# Patient Record
Sex: FEMALE | Race: WHITE
[De-identification: names, ages, dates, MRNs, and addresses within clinical notes are randomized per-mention and may not be internally consistent; named-entity substitution may affect disease eponyms.]

---

## 2018-09-18 ENCOUNTER — HOSPITAL ENCOUNTER (OUTPATIENT)
Dept: HOSPITAL 19 - COL.RAD | Age: 83
End: 2018-09-18
Attending: FAMILY MEDICINE
Payer: MEDICARE

## 2018-09-18 DIAGNOSIS — S22.070G: Primary | ICD-10-CM

## 2018-09-18 DIAGNOSIS — M48.04: ICD-10-CM

## 2018-09-27 ENCOUNTER — HOSPITAL ENCOUNTER (OUTPATIENT)
Dept: HOSPITAL 19 - COL.CAR | Age: 83
Discharge: HOME | End: 2018-09-27
Attending: FAMILY MEDICINE
Payer: MEDICARE

## 2018-09-27 VITALS — HEART RATE: 66 BPM | SYSTOLIC BLOOD PRESSURE: 134 MMHG | DIASTOLIC BLOOD PRESSURE: 66 MMHG

## 2018-09-27 VITALS — DIASTOLIC BLOOD PRESSURE: 66 MMHG | SYSTOLIC BLOOD PRESSURE: 150 MMHG | HEART RATE: 69 BPM

## 2018-09-27 VITALS — DIASTOLIC BLOOD PRESSURE: 69 MMHG | TEMPERATURE: 98.5 F | SYSTOLIC BLOOD PRESSURE: 158 MMHG | HEART RATE: 66 BPM

## 2018-09-27 VITALS — BODY MASS INDEX: 19 KG/M2 | HEIGHT: 67.01 IN | WEIGHT: 121.03 LBS

## 2018-09-27 VITALS — SYSTOLIC BLOOD PRESSURE: 137 MMHG | DIASTOLIC BLOOD PRESSURE: 66 MMHG | HEART RATE: 64 BPM

## 2018-09-27 VITALS — DIASTOLIC BLOOD PRESSURE: 85 MMHG | HEART RATE: 73 BPM | SYSTOLIC BLOOD PRESSURE: 178 MMHG

## 2018-09-27 VITALS — HEART RATE: 73 BPM | DIASTOLIC BLOOD PRESSURE: 82 MMHG | SYSTOLIC BLOOD PRESSURE: 156 MMHG

## 2018-09-27 VITALS — HEART RATE: 66 BPM | DIASTOLIC BLOOD PRESSURE: 65 MMHG | SYSTOLIC BLOOD PRESSURE: 145 MMHG

## 2018-09-27 VITALS — DIASTOLIC BLOOD PRESSURE: 64 MMHG | SYSTOLIC BLOOD PRESSURE: 128 MMHG | HEART RATE: 64 BPM

## 2018-09-27 DIAGNOSIS — S22.080A: Primary | ICD-10-CM

## 2018-09-27 DIAGNOSIS — Z90.710: ICD-10-CM

## 2018-09-27 DIAGNOSIS — Z96.642: ICD-10-CM

## 2018-09-27 DIAGNOSIS — Z88.8: ICD-10-CM

## 2018-11-05 ENCOUNTER — HOSPITAL ENCOUNTER (EMERGENCY)
Dept: HOSPITAL 19 - COL.ER | Age: 83
End: 2018-11-05
Payer: MEDICARE

## 2018-11-05 VITALS — HEART RATE: 85 BPM | DIASTOLIC BLOOD PRESSURE: 66 MMHG | SYSTOLIC BLOOD PRESSURE: 147 MMHG

## 2018-11-05 VITALS — WEIGHT: 115.3 LBS | BODY MASS INDEX: 20.43 KG/M2 | HEIGHT: 62.99 IN

## 2018-11-05 VITALS — TEMPERATURE: 98 F
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
## 2018-11-05 DIAGNOSIS — Z91.81: ICD-10-CM

## 2018-11-05 DIAGNOSIS — Z79.51: ICD-10-CM

## 2018-11-05 DIAGNOSIS — F03.90: ICD-10-CM

## 2018-11-05 DIAGNOSIS — S30.0XXA: Primary | ICD-10-CM
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
## 2020-05-04 ENCOUNTER — HOSPITAL ENCOUNTER (INPATIENT)
Dept: HOSPITAL 19 - SURG | Age: 85
LOS: 6 days | Discharge: NURSING, REHAB OR LONG-TERM CARE | DRG: 480 | End: 2020-05-10
Attending: ORTHOPAEDIC SURGERY | Admitting: INTERNAL MEDICINE
Payer: MEDICARE

## 2020-05-04 VITALS — TEMPERATURE: 98.9 F | HEART RATE: 78 BPM | DIASTOLIC BLOOD PRESSURE: 69 MMHG | SYSTOLIC BLOOD PRESSURE: 106 MMHG

## 2020-05-04 VITALS — WEIGHT: 128.97 LBS | BODY MASS INDEX: 20.24 KG/M2 | HEIGHT: 67.01 IN

## 2020-05-04 VITALS — SYSTOLIC BLOOD PRESSURE: 148 MMHG | HEART RATE: 71 BPM | TEMPERATURE: 97.5 F | DIASTOLIC BLOOD PRESSURE: 65 MMHG

## 2020-05-04 VITALS — TEMPERATURE: 97.8 F | SYSTOLIC BLOOD PRESSURE: 141 MMHG | HEART RATE: 87 BPM | DIASTOLIC BLOOD PRESSURE: 81 MMHG

## 2020-05-04 DIAGNOSIS — F03.90: ICD-10-CM

## 2020-05-04 DIAGNOSIS — D64.9: ICD-10-CM

## 2020-05-04 DIAGNOSIS — M19.90: ICD-10-CM

## 2020-05-04 DIAGNOSIS — B96.20: ICD-10-CM
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
## 2020-05-04 DIAGNOSIS — N39.0: ICD-10-CM

## 2020-05-04 DIAGNOSIS — Z20.828: ICD-10-CM

## 2020-05-04 DIAGNOSIS — G89.29: ICD-10-CM

## 2020-05-04 DIAGNOSIS — J69.0: ICD-10-CM

## 2020-05-04 DIAGNOSIS — I48.0: ICD-10-CM

## 2020-05-04 DIAGNOSIS — Z90.710: ICD-10-CM

## 2020-05-04 DIAGNOSIS — F41.9: ICD-10-CM

## 2020-05-04 DIAGNOSIS — Z96.642: ICD-10-CM

## 2020-05-04 DIAGNOSIS — K21.9: ICD-10-CM

## 2020-05-04 DIAGNOSIS — E87.1: ICD-10-CM

## 2020-05-04 DIAGNOSIS — R50.9: ICD-10-CM

## 2020-05-04 DIAGNOSIS — J96.01: ICD-10-CM

## 2020-05-04 DIAGNOSIS — K59.00: ICD-10-CM

## 2020-05-04 DIAGNOSIS — S72.001A: Primary | ICD-10-CM

## 2020-05-04 DIAGNOSIS — Z66: ICD-10-CM

## 2020-05-04 DIAGNOSIS — M54.9: ICD-10-CM

## 2020-05-04 DIAGNOSIS — F32.9: ICD-10-CM

## 2020-05-04 DIAGNOSIS — Z90.89: ICD-10-CM

## 2020-05-04 LAB
ALBUMIN SERPL-MCNC: 4.2 GM/DL (ref 3.5–5)
ALP SERPL-CCNC: 101 U/L (ref 50–136)
ALT SERPL-CCNC: 16 U/L (ref 4–34)
ANION GAP SERPL CALC-SCNC: 8 MMOL/L (ref 7–16)
AST SERPL-CCNC: 32 U/L (ref 15–37)
BASOPHILS # BLD: 0 10*3/UL (ref 0–0.2)
BASOPHILS NFR BLD AUTO: 0.5 % (ref 0–2)
BILIRUB SERPL-MCNC: 0.6 MG/DL (ref 0–1)
BUN SERPL-MCNC: 19 MG/DL (ref 7–17)
CALCIUM SERPL-MCNC: 8.9 MG/DL (ref 8.4–10.2)
CHLORIDE SERPL-SCNC: 95 MMOL/L (ref 98–107)
CO2 SERPL-SCNC: 28 MMOL/L (ref 22–30)
CREAT SERPL-SCNC: 0.87 UMOL/L (ref 0.52–1.25)
EOSINOPHIL # BLD: 0.2 10*3/UL (ref 0–0.7)
EOSINOPHIL NFR BLD: 2.7 % (ref 0–4)
ERYTHROCYTE [DISTWIDTH] IN BLOOD BY AUTOMATED COUNT: 12.6 % (ref 11.5–14.5)
GLUCOSE SERPL-MCNC: 101 MG/DL (ref 74–106)
GRANULOCYTES # BLD AUTO: 68.9 % (ref 42.2–75.2)
HCT VFR BLD AUTO: 37.3 % (ref 37–47)
HGB BLD-MCNC: 12.4 G/DL (ref 12.5–16)
INR BLD: 1 (ref 0.8–3)
LYMPHOCYTES # BLD: 1.1 10*3/UL (ref 1.2–3.4)
LYMPHOCYTES NFR BLD: 19.5 % (ref 20–51)
MCH RBC QN AUTO: 32 PG (ref 27–31)
MCHC RBC AUTO-ENTMCNC: 33 G/DL (ref 33–37)
MCV RBC AUTO: 96 FL (ref 80–100)
MONOCYTES # BLD: 0.5 10*3/UL (ref 0.1–0.6)
MONOCYTES NFR BLD AUTO: 8 % (ref 1.7–9.3)
NEUTROPHILS # BLD: 3.9 10*3/UL (ref 1.4–6.5)
PH UR STRIP.AUTO: 7 [PH] (ref 5–8)
PLATELET # BLD AUTO: 170 K/MM3 (ref 130–400)
PMV BLD AUTO: 9 FL (ref 7.4–10.4)
POTASSIUM SERPL-SCNC: 4.4 MMOL/L (ref 3.4–5)
PRE ALBUMIN: 27.6 MG/DL (ref 17.6–36)
PROT SERPL-MCNC: 7.2 GM/DL (ref 6.4–8.2)
PROTHROMBIN TIME: 11.3 SECONDS (ref 9.7–12.8)
RBC # BLD AUTO: 3.87 M/MM3 (ref 4.1–5.3)
RBC # UR: (no result) /HPF
SODIUM SERPL-SCNC: 131 MMOL/L (ref 137–145)
SP GR UR STRIP.AUTO: 1.01 (ref 1–1.03)
URN COLLECT METHOD CLASS: (no result)

## 2020-05-04 PROCEDURE — C1713 ANCHOR/SCREW BN/BN,TIS/BN: HCPCS

## 2020-05-04 PROCEDURE — A9284 NON-ELECTRONIC SPIROMETER: HCPCS

## 2020-05-04 NOTE — NUR
16 Nepali BUSBY CATHETER INSERTED VIA STERILE TECHNIQUE. CLOUDY, PALE YELLOW
URINE RETURNED. UA COLLECTED AND SENT TO LAB. PERICARE PROVIDED. STAT LOCK TO
LEFT LEG. BENNY HOSE APPLIED TO LLE. SCD'S APPLIED TO BLE. POSITIVE PEDAL PULSES
EQUAL BILATERALLY. CAP REFILL <3 SECONDS. CMS INTACT. IV FLUIDS INFUSING TO
RIGHT WRIST IV VIA PUMP. CALL LIGHT WITHIN REACH. DINNER TRAY ORDERED. NO
OTHER NEEDS AT THIS TIME.

## 2020-05-04 NOTE — NUR
Received report from DC Wilson. Pt is currently lying in bed. Pt bed alarm
has gone off serval time. Pt wants to get out of bed. Pt was asked if she has
to have a bowel movement she stated no. Pt is confused. Pt was redirected and
was informed that she has a catheter in place. Pt was also given her night
medications and Tylenol at this time. Patient stated she's hurting when she
moves her leg. Pt lungs sounds were clear, her heart rate was normal S1 and S2
sounds. Pt bowel sounds were hypoactive. Pt is now resting in bed and she was
given a warm blanket for comfort. Pt has her call light within reach and will
continue to monitor to make sure she not trying to exit her bed. Bed alarm is
on at this time.

## 2020-05-05 VITALS — DIASTOLIC BLOOD PRESSURE: 73 MMHG | HEART RATE: 78 BPM | TEMPERATURE: 98.1 F | SYSTOLIC BLOOD PRESSURE: 151 MMHG

## 2020-05-05 VITALS — HEART RATE: 90 BPM | SYSTOLIC BLOOD PRESSURE: 172 MMHG | DIASTOLIC BLOOD PRESSURE: 88 MMHG | TEMPERATURE: 98 F

## 2020-05-05 VITALS — SYSTOLIC BLOOD PRESSURE: 130 MMHG | HEART RATE: 95 BPM | TEMPERATURE: 98 F | DIASTOLIC BLOOD PRESSURE: 90 MMHG

## 2020-05-05 VITALS — HEART RATE: 72 BPM | TEMPERATURE: 97.5 F | SYSTOLIC BLOOD PRESSURE: 142 MMHG | DIASTOLIC BLOOD PRESSURE: 73 MMHG

## 2020-05-05 VITALS — TEMPERATURE: 98 F | SYSTOLIC BLOOD PRESSURE: 172 MMHG | HEART RATE: 90 BPM | DIASTOLIC BLOOD PRESSURE: 88 MMHG

## 2020-05-05 VITALS — HEART RATE: 72 BPM | SYSTOLIC BLOOD PRESSURE: 151 MMHG | DIASTOLIC BLOOD PRESSURE: 66 MMHG | TEMPERATURE: 98.6 F

## 2020-05-05 VITALS — SYSTOLIC BLOOD PRESSURE: 94 MMHG | HEART RATE: 101 BPM | TEMPERATURE: 98 F | DIASTOLIC BLOOD PRESSURE: 66 MMHG

## 2020-05-05 VITALS — SYSTOLIC BLOOD PRESSURE: 106 MMHG | HEART RATE: 78 BPM | DIASTOLIC BLOOD PRESSURE: 69 MMHG | TEMPERATURE: 98.9 F

## 2020-05-05 VITALS — DIASTOLIC BLOOD PRESSURE: 78 MMHG | HEART RATE: 94 BPM | TEMPERATURE: 98 F | SYSTOLIC BLOOD PRESSURE: 173 MMHG

## 2020-05-05 VITALS — DIASTOLIC BLOOD PRESSURE: 81 MMHG | SYSTOLIC BLOOD PRESSURE: 183 MMHG | TEMPERATURE: 98 F | HEART RATE: 80 BPM

## 2020-05-05 VITALS — HEART RATE: 92 BPM | DIASTOLIC BLOOD PRESSURE: 112 MMHG | SYSTOLIC BLOOD PRESSURE: 173 MMHG | TEMPERATURE: 98 F

## 2020-05-05 VITALS — HEART RATE: 79 BPM | DIASTOLIC BLOOD PRESSURE: 71 MMHG | SYSTOLIC BLOOD PRESSURE: 156 MMHG | TEMPERATURE: 98 F

## 2020-05-05 VITALS — DIASTOLIC BLOOD PRESSURE: 68 MMHG | HEART RATE: 87 BPM | TEMPERATURE: 98 F | SYSTOLIC BLOOD PRESSURE: 163 MMHG

## 2020-05-05 VITALS — SYSTOLIC BLOOD PRESSURE: 137 MMHG | HEART RATE: 71 BPM | TEMPERATURE: 98.8 F | DIASTOLIC BLOOD PRESSURE: 62 MMHG

## 2020-05-05 LAB
ANION GAP SERPL CALC-SCNC: 5 MMOL/L (ref 7–16)
BUN SERPL-MCNC: 12 MG/DL (ref 7–17)
CALCIUM SERPL-MCNC: 8.6 MG/DL (ref 8.4–10.2)
CHLORIDE SERPL-SCNC: 100 MMOL/L (ref 98–107)
CO2 SERPL-SCNC: 28 MMOL/L (ref 22–30)
CREAT SERPL-SCNC: 0.75 UMOL/L (ref 0.52–1.25)
ERYTHROCYTE [DISTWIDTH] IN BLOOD BY AUTOMATED COUNT: 12.4 % (ref 11.5–14.5)
GLUCOSE SERPL-MCNC: 102 MG/DL (ref 74–106)
HCT VFR BLD AUTO: 35.7 % (ref 37–47)
HGB BLD-MCNC: 11.8 G/DL (ref 12.5–16)
MAGNESIUM SERPL-MCNC: 2.2 MG/DL (ref 1.6–2.3)
MCH RBC QN AUTO: 32 PG (ref 27–31)
MCHC RBC AUTO-ENTMCNC: 33 G/DL (ref 33–37)
MCV RBC AUTO: 98 FL (ref 80–100)
PLATELET # BLD AUTO: 146 K/MM3 (ref 130–400)
PMV BLD AUTO: 9.3 FL (ref 7.4–10.4)
POTASSIUM SERPL-SCNC: 4.1 MMOL/L (ref 3.4–5)
RBC # BLD AUTO: 3.65 M/MM3 (ref 4.1–5.3)
SODIUM SERPL-SCNC: 132 MMOL/L (ref 137–145)

## 2020-05-05 PROCEDURE — 0QS834Z REPOSITION RIGHT FEMORAL SHAFT WITH INTERNAL FIXATION DEVICE, PERCUTANEOUS APPROACH: ICD-10-PCS | Performed by: ORTHOPAEDIC SURGERY

## 2020-05-05 NOTE — NUR
contacted patient's daughter/OSBALDO Hoang (ph#[phone]) to
discuss discharge planning as patient has dementia. Per Ginger, patient lives
at [facility] and has been there about two years. Patient sees Dr. Vazquez for primary care and uses a walker or wheelchair for ambulation.
Patient does receive some assistance as needed with ADLS at [facility]. Ginger
reports the plan is for patient to return to [facility]. ZUNILDA contacted Carol
at [facility] who advised patient is from their special care unit at [facility]. Due to COVID 19 pandemic, patient will discharge to the [facility]
for skilled care instead of skilled care at [place]. ZUNILDA faxed referral to
Carol at [facility]. SW to continue to follow.

## 2020-05-05 NOTE — NUR
Pt was beginning to try to get out of bed. Pt was also rubbing her hip and
complainting of pain. Pt was given Tylenol and a small sip of water at this
time. Pt is now lying in bed with call light within reach and bed in lowest
position and alarm on.

## 2020-05-05 NOTE — NUR
Pt daughter called who is the pt DOPA and we were able to obtain the consent
for surgery. Pt is currently sleeping in bed. Pt did wake up for vitals but
went back to sleep.

## 2020-05-05 NOTE — NUR
PT TO ROOM 326 PER BED WITH TATI IRWIN PACU@1200. PT IS DROWSEY BUT AROUSEABLE.
DRESSING TO RIGHT HIP CDI WITH AQUACEL OVER INCISION. IV TO PUMP PER ORDERS.

## 2020-05-05 NOTE — NUR
PT RESTING IN BED, PLAN ON SURGERY THIS AM. WITH DR. HERRON FOR HIP PINNING.
CONSENT SIGNED ON CHART. PT RESTING QUIETLY AT THIS TIME.

## 2020-05-05 NOTE — NUR
Reported off to DC Chacko. Pt is lying in be with call light within reach and
bed in lowest positon and alarm on.

## 2020-05-06 VITALS — TEMPERATURE: 102.7 F

## 2020-05-06 VITALS — TEMPERATURE: 103 F | SYSTOLIC BLOOD PRESSURE: 165 MMHG | DIASTOLIC BLOOD PRESSURE: 79 MMHG | HEART RATE: 100 BPM

## 2020-05-06 VITALS — TEMPERATURE: 99.4 F

## 2020-05-06 VITALS — HEART RATE: 83 BPM | DIASTOLIC BLOOD PRESSURE: 103 MMHG | TEMPERATURE: 99.2 F | SYSTOLIC BLOOD PRESSURE: 124 MMHG

## 2020-05-06 VITALS — DIASTOLIC BLOOD PRESSURE: 67 MMHG | SYSTOLIC BLOOD PRESSURE: 138 MMHG | HEART RATE: 67 BPM | TEMPERATURE: 97.6 F

## 2020-05-06 VITALS — TEMPERATURE: 98.4 F | HEART RATE: 104 BPM | SYSTOLIC BLOOD PRESSURE: 127 MMHG | DIASTOLIC BLOOD PRESSURE: 90 MMHG

## 2020-05-06 VITALS — DIASTOLIC BLOOD PRESSURE: 31 MMHG | TEMPERATURE: 98.8 F | SYSTOLIC BLOOD PRESSURE: 111 MMHG | HEART RATE: 80 BPM

## 2020-05-06 LAB
ANION GAP SERPL CALC-SCNC: 9 MMOL/L (ref 7–16)
BASOPHILS # BLD: 0 10*3/UL (ref 0–0.2)
BASOPHILS NFR BLD AUTO: 0.3 % (ref 0–2)
BUN SERPL-MCNC: 11 MG/DL (ref 7–17)
CALCIUM SERPL-MCNC: 8.7 MG/DL (ref 8.4–10.2)
CHLORIDE SERPL-SCNC: 99 MMOL/L (ref 98–107)
CO2 SERPL-SCNC: 26 MMOL/L (ref 22–30)
CREAT SERPL-SCNC: 0.63 UMOL/L (ref 0.52–1.25)
EOSINOPHIL # BLD: 0.2 10*3/UL (ref 0–0.7)
EOSINOPHIL NFR BLD: 2.8 % (ref 0–4)
ERYTHROCYTE [DISTWIDTH] IN BLOOD BY AUTOMATED COUNT: 12.3 % (ref 11.5–14.5)
GLUCOSE SERPL-MCNC: 101 MG/DL (ref 74–106)
GRANULOCYTES # BLD AUTO: 71.8 % (ref 42.2–75.2)
HCT VFR BLD AUTO: 35.3 % (ref 37–47)
HGB BLD-MCNC: 11.6 G/DL (ref 12.5–16)
LYMPHOCYTES # BLD: 1.2 10*3/UL (ref 1.2–3.4)
LYMPHOCYTES NFR BLD: 17.5 % (ref 20–51)
MCH RBC QN AUTO: 32 PG (ref 27–31)
MCHC RBC AUTO-ENTMCNC: 33 G/DL (ref 33–37)
MCV RBC AUTO: 98 FL (ref 80–100)
MONOCYTES # BLD: 0.5 10*3/UL (ref 0.1–0.6)
MONOCYTES NFR BLD AUTO: 7.2 % (ref 1.7–9.3)
NEUTROPHILS # BLD: 5 10*3/UL (ref 1.4–6.5)
PLATELET # BLD AUTO: 170 K/MM3 (ref 130–400)
PMV BLD AUTO: 9.4 FL (ref 7.4–10.4)
POTASSIUM SERPL-SCNC: 3.6 MMOL/L (ref 3.4–5)
RBC # BLD AUTO: 3.59 M/MM3 (ref 4.1–5.3)
SODIUM SERPL-SCNC: 134 MMOL/L (ref 137–145)

## 2020-05-06 NOTE — NUR
Pt temperature was assessed at this time pt temperature was 99.4. Will
continute to monitor pt. Pt is currently sleeping but woke up fine for me to
do her temperature. Her call light is within reach and her bed is in lowest
position and alarm is on.

## 2020-05-06 NOTE — NUR
contacted Carol at [facility] and faxed updates. SW contacted
patient's daughter, Natalya and left a message. Per Hospitalist, patient may be
ready for discharge tomorrow. SW to continue to follow.

## 2020-05-06 NOTE — NUR
Received report from DC Chacko. Pt yamilanly lying in bed with  her call light
within reach and her bed in lowest position.

## 2020-05-06 NOTE — NUR
Patient has rested well throughout the night. Mims draining clear, yellow
urine. Dressing to right hip clean, dry, and intact. Patient up to the bedside
commode via 2 assist from staff. Patient then complained of a lot of pain to
the right hip. PRN Norco given, as it wasn't time for Ultram yet, and this was
effective for pain relief. Patient was reoriented frequently about where she
was, and why she was here. Patient's daughter, Natalya, called for an update
and this was provided for her. States she wants to talk with her in the
morning. Vital signs stable. Will continue to monitor.

## 2020-05-06 NOTE — NUR
I was informed by the aide that the pt has a temp of 103. I went in to assess
the pt and had the pt do the IS a couple of times. Temp did go down to 102.7
at this time. Pt was given Tylenol at this time. Will follow up to see if the
temperature improves. Pt has her call light within reach and her bed is in
lowest position.

## 2020-05-07 VITALS — SYSTOLIC BLOOD PRESSURE: 157 MMHG | HEART RATE: 93 BPM | DIASTOLIC BLOOD PRESSURE: 84 MMHG | TEMPERATURE: 97.5 F

## 2020-05-07 VITALS — HEART RATE: 97 BPM | TEMPERATURE: 98.6 F | DIASTOLIC BLOOD PRESSURE: 62 MMHG | SYSTOLIC BLOOD PRESSURE: 100 MMHG

## 2020-05-07 VITALS — DIASTOLIC BLOOD PRESSURE: 78 MMHG | SYSTOLIC BLOOD PRESSURE: 139 MMHG | TEMPERATURE: 97.6 F | HEART RATE: 72 BPM

## 2020-05-07 VITALS — DIASTOLIC BLOOD PRESSURE: 76 MMHG | SYSTOLIC BLOOD PRESSURE: 146 MMHG | HEART RATE: 80 BPM | TEMPERATURE: 97.8 F

## 2020-05-07 VITALS — HEART RATE: 67 BPM | DIASTOLIC BLOOD PRESSURE: 67 MMHG | TEMPERATURE: 98.2 F | SYSTOLIC BLOOD PRESSURE: 139 MMHG

## 2020-05-07 VITALS — TEMPERATURE: 97.6 F | HEART RATE: 73 BPM | DIASTOLIC BLOOD PRESSURE: 60 MMHG | SYSTOLIC BLOOD PRESSURE: 124 MMHG

## 2020-05-07 LAB
ANION GAP SERPL CALC-SCNC: 6 MMOL/L (ref 7–16)
BASOPHILS # BLD: 0 10*3/UL (ref 0–0.2)
BASOPHILS NFR BLD AUTO: 0.3 % (ref 0–2)
BUN SERPL-MCNC: 13 MG/DL (ref 7–17)
CALCIUM SERPL-MCNC: 8.5 MG/DL (ref 8.4–10.2)
CHLORIDE SERPL-SCNC: 97 MMOL/L (ref 98–107)
CO2 SERPL-SCNC: 29 MMOL/L (ref 22–30)
CREAT SERPL-SCNC: 0.66 UMOL/L (ref 0.52–1.25)
EOSINOPHIL # BLD: 0.1 10*3/UL (ref 0–0.7)
EOSINOPHIL NFR BLD: 1.8 % (ref 0–4)
ERYTHROCYTE [DISTWIDTH] IN BLOOD BY AUTOMATED COUNT: 12.3 % (ref 11.5–14.5)
GLUCOSE SERPL-MCNC: 99 MG/DL (ref 74–106)
GRANULOCYTES # BLD AUTO: 78 % (ref 42.2–75.2)
HCT VFR BLD AUTO: 30.4 % (ref 37–47)
HGB BLD-MCNC: 10.1 G/DL (ref 12.5–16)
LYMPHOCYTES # BLD: 0.6 10*3/UL (ref 1.2–3.4)
LYMPHOCYTES NFR BLD: 10.2 % (ref 20–51)
MCH RBC QN AUTO: 32 PG (ref 27–31)
MCHC RBC AUTO-ENTMCNC: 33 G/DL (ref 33–37)
MCV RBC AUTO: 97 FL (ref 80–100)
MONOCYTES # BLD: 0.6 10*3/UL (ref 0.1–0.6)
MONOCYTES NFR BLD AUTO: 9.2 % (ref 1.7–9.3)
NEUTROPHILS # BLD: 4.6 10*3/UL (ref 1.4–6.5)
PLATELET # BLD AUTO: 157 K/MM3 (ref 130–400)
PMV BLD AUTO: 9.5 FL (ref 7.4–10.4)
POTASSIUM SERPL-SCNC: 3.4 MMOL/L (ref 3.4–5)
RBC # BLD AUTO: 3.15 M/MM3 (ref 4.1–5.3)
SODIUM SERPL-SCNC: 132 MMOL/L (ref 137–145)

## 2020-05-07 NOTE — NUR
Reported off to DC Petit. Pt slept well during the night pt had one time where
she was incontinent. Pt was cleaned using personal cleaning wipes. A breif was
put on her at this time. Pt had no redness on her bottom. Pt was awake and
getting ready for her breakfast at this time. Pt has her call light within
reach and her bed is in lowest position.

## 2020-05-07 NOTE — NUR
collaborated with CHERI Maldonado who advised patient will not
discharge today as she had a fever and new oxygen needs. Patient to be tested
for COVID 19. SW contacted Carol at [facility] and patient's daughter Natalya
to provide update. SW to continue to follow.

## 2020-05-07 NOTE — NUR
Pt currently lying in bed. At the beginning of the shift pt was trying to exit
the bed. 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
choir practice. Pt was very confused and it took a while to let the pt know
that she was in the hospital in [place]. Pt lungs sounds did have some
wheezing in the lower lobes. Pt continued to cough and after coughing they did
shound clear. Pt heart sounds were normal S1 and S2 sounds. Pt had two IV
sites on her right wrist, the IV site that was dated for yesterday was patent,
but the IV in the lower forearm was removed at this time because it was not
patent. So now pt currently has one IV site on her right forearm. Pt has a
small skin tear on the front of her left leg that has a bandaid on it.
Bandaid is clean, dry, and intact. Pt has her call light within reach and her
bed is in lowest posiion with alarm on.

## 2020-05-08 VITALS — SYSTOLIC BLOOD PRESSURE: 141 MMHG | DIASTOLIC BLOOD PRESSURE: 86 MMHG | HEART RATE: 94 BPM | TEMPERATURE: 98.8 F

## 2020-05-08 VITALS — TEMPERATURE: 99.1 F | SYSTOLIC BLOOD PRESSURE: 136 MMHG | DIASTOLIC BLOOD PRESSURE: 66 MMHG | HEART RATE: 70 BPM

## 2020-05-08 VITALS — SYSTOLIC BLOOD PRESSURE: 149 MMHG | DIASTOLIC BLOOD PRESSURE: 56 MMHG | HEART RATE: 72 BPM | TEMPERATURE: 97.9 F

## 2020-05-08 VITALS — HEART RATE: 70 BPM | DIASTOLIC BLOOD PRESSURE: 66 MMHG | SYSTOLIC BLOOD PRESSURE: 128 MMHG | TEMPERATURE: 98.7 F

## 2020-05-08 VITALS — SYSTOLIC BLOOD PRESSURE: 154 MMHG | TEMPERATURE: 98.4 F | DIASTOLIC BLOOD PRESSURE: 68 MMHG | HEART RATE: 76 BPM

## 2020-05-08 VITALS — DIASTOLIC BLOOD PRESSURE: 92 MMHG | SYSTOLIC BLOOD PRESSURE: 110 MMHG | TEMPERATURE: 98.6 F | HEART RATE: 76 BPM

## 2020-05-08 LAB
ANION GAP SERPL CALC-SCNC: 8 MMOL/L (ref 7–16)
BASOPHILS # BLD: 0 10*3/UL (ref 0–0.2)
BASOPHILS NFR BLD AUTO: 0.5 % (ref 0–2)
BUN SERPL-MCNC: 17 MG/DL (ref 7–17)
CALCIUM SERPL-MCNC: 8.5 MG/DL (ref 8.4–10.2)
CHLORIDE SERPL-SCNC: 97 MMOL/L (ref 98–107)
CO2 SERPL-SCNC: 29 MMOL/L (ref 22–30)
CREAT SERPL-SCNC: 0.66 UMOL/L (ref 0.52–1.25)
EOSINOPHIL # BLD: 0.2 10*3/UL (ref 0–0.7)
EOSINOPHIL NFR BLD: 3.2 % (ref 0–4)
ERYTHROCYTE [DISTWIDTH] IN BLOOD BY AUTOMATED COUNT: 12.4 % (ref 11.5–14.5)
GLUCOSE SERPL-MCNC: 97 MG/DL (ref 74–106)
GRANULOCYTES # BLD AUTO: 71.9 % (ref 42.2–75.2)
HCT VFR BLD AUTO: 28.9 % (ref 37–47)
HGB BLD-MCNC: 9.7 G/DL (ref 12.5–16)
LYMPHOCYTES # BLD: 0.8 10*3/UL (ref 1.2–3.4)
LYMPHOCYTES NFR BLD: 14.4 % (ref 20–51)
MCH RBC QN AUTO: 32 PG (ref 27–31)
MCHC RBC AUTO-ENTMCNC: 34 G/DL (ref 33–37)
MCV RBC AUTO: 96 FL (ref 80–100)
MONOCYTES # BLD: 0.5 10*3/UL (ref 0.1–0.6)
MONOCYTES NFR BLD AUTO: 9.5 % (ref 1.7–9.3)
NEUTROPHILS # BLD: 4 10*3/UL (ref 1.4–6.5)
PLATELET # BLD AUTO: 168 K/MM3 (ref 130–400)
PMV BLD AUTO: 9.4 FL (ref 7.4–10.4)
POTASSIUM SERPL-SCNC: 3.6 MMOL/L (ref 3.4–5)
RBC # BLD AUTO: 3 M/MM3 (ref 4.1–5.3)
SODIUM SERPL-SCNC: 134 MMOL/L (ref 137–145)

## 2020-05-08 NOTE — NUR
Reported off to DC Chacko. Pt is currently lying in bed sleeping. Pt has her
call light within reach and bed is in lowest position, alarm is on.

## 2020-05-08 NOTE — NUR
The patient's COVID results are still pending. The patient is to tentatively
discharge back to [facility] this weekend, once results are in. ZUNILDA contacted
and updated the patient's daughter, Natalya. Natalya verbalized understanding
and is agreeable to this. ZUNILDA contacted and faxed updates to Carol at
[facility]. SW to continue to follow.

## 2020-05-08 NOTE — NUR
Pt has been trying to exit her bed. Pt did state that she was having pain in
her leg. Pt was given Tylenol at this time. Pt was very anxious and saying she
that she has to cook dinner for her children. Pt was getting up set because
she couldn't get to the kitchen. Pt is safely in bed. Pt was repositioned at
this time. Pt did ambulate to the bed side commode earlier. Pt has her call
light within reach and her bed is in lowest positon.

## 2020-05-09 VITALS — DIASTOLIC BLOOD PRESSURE: 61 MMHG | HEART RATE: 67 BPM | TEMPERATURE: 98.5 F | SYSTOLIC BLOOD PRESSURE: 136 MMHG

## 2020-05-09 VITALS — TEMPERATURE: 98.6 F | DIASTOLIC BLOOD PRESSURE: 52 MMHG | HEART RATE: 72 BPM | SYSTOLIC BLOOD PRESSURE: 124 MMHG

## 2020-05-09 VITALS — TEMPERATURE: 99.1 F | DIASTOLIC BLOOD PRESSURE: 54 MMHG | HEART RATE: 70 BPM | SYSTOLIC BLOOD PRESSURE: 129 MMHG

## 2020-05-09 VITALS — SYSTOLIC BLOOD PRESSURE: 139 MMHG | DIASTOLIC BLOOD PRESSURE: 67 MMHG | HEART RATE: 88 BPM | TEMPERATURE: 98 F

## 2020-05-09 VITALS — DIASTOLIC BLOOD PRESSURE: 72 MMHG | HEART RATE: 74 BPM | SYSTOLIC BLOOD PRESSURE: 146 MMHG

## 2020-05-09 VITALS — DIASTOLIC BLOOD PRESSURE: 85 MMHG | TEMPERATURE: 98.4 F | HEART RATE: 82 BPM | SYSTOLIC BLOOD PRESSURE: 120 MMHG

## 2020-05-09 VITALS — SYSTOLIC BLOOD PRESSURE: 135 MMHG | DIASTOLIC BLOOD PRESSURE: 64 MMHG | HEART RATE: 77 BPM | TEMPERATURE: 98 F

## 2020-05-09 VITALS — DIASTOLIC BLOOD PRESSURE: 69 MMHG | TEMPERATURE: 98.2 F | SYSTOLIC BLOOD PRESSURE: 85 MMHG | HEART RATE: 73 BPM

## 2020-05-09 LAB
ANION GAP SERPL CALC-SCNC: 6 MMOL/L (ref 7–16)
BASOPHILS # BLD: 0 10*3/UL (ref 0–0.2)
BASOPHILS NFR BLD AUTO: 0.7 % (ref 0–2)
BUN SERPL-MCNC: 17 MG/DL (ref 7–17)
CALCIUM SERPL-MCNC: 8.4 MG/DL (ref 8.4–10.2)
CHLORIDE SERPL-SCNC: 97 MMOL/L (ref 98–107)
CO2 SERPL-SCNC: 30 MMOL/L (ref 22–30)
CREAT SERPL-SCNC: 0.59 UMOL/L (ref 0.52–1.25)
EOSINOPHIL # BLD: 0.2 10*3/UL (ref 0–0.7)
EOSINOPHIL NFR BLD: 3.5 % (ref 0–4)
ERYTHROCYTE [DISTWIDTH] IN BLOOD BY AUTOMATED COUNT: 12.3 % (ref 11.5–14.5)
GLUCOSE SERPL-MCNC: 97 MG/DL (ref 74–106)
GRANULOCYTES # BLD AUTO: 70.2 % (ref 42.2–75.2)
HCT VFR BLD AUTO: 28 % (ref 37–47)
HGB BLD-MCNC: 9.3 G/DL (ref 12.5–16)
LYMPHOCYTES # BLD: 0.7 10*3/UL (ref 1.2–3.4)
LYMPHOCYTES NFR BLD: 15 % (ref 20–51)
MCH RBC QN AUTO: 32 PG (ref 27–31)
MCHC RBC AUTO-ENTMCNC: 33 G/DL (ref 33–37)
MCV RBC AUTO: 96 FL (ref 80–100)
MONOCYTES # BLD: 0.5 10*3/UL (ref 0.1–0.6)
MONOCYTES NFR BLD AUTO: 10.2 % (ref 1.7–9.3)
NEUTROPHILS # BLD: 3.2 10*3/UL (ref 1.4–6.5)
PLATELET # BLD AUTO: 195 K/MM3 (ref 130–400)
PMV BLD AUTO: 9.5 FL (ref 7.4–10.4)
POTASSIUM SERPL-SCNC: 3.5 MMOL/L (ref 3.4–5)
RBC # BLD AUTO: 2.91 M/MM3 (ref 4.1–5.3)
SODIUM SERPL-SCNC: 133 MMOL/L (ref 137–145)

## 2020-05-09 NOTE — NUR
Patient in bed resting. Alert and oriented to self. Denies pain at this time.
Assisted patient to sit up for breakfast. Aquacel to right hip is CDI. Francisco Javier
hose and SCD to BLE. Pedal pulses intact. Patient on 2L of O2 via NC. Denies
further needs at this time.

## 2020-05-09 NOTE — NUR
Patient is at confused at baseline. Aquacell dressing to right hip clean, dry
in tact.  Patient was able to ambulate into restrooom several times this
shift. Patient was afebrile this shift. Simonanet doesn't keep nasal cannula in
nose, but sats remain at uper 80s to 90% on room air. Patient plans to
discharge back to nursing home once covid test comes back.

## 2020-05-09 NOTE — NUR
Patient has done well throughout the day. Napping on and off. States she feels
tired and was not able to sleep well last night. Currently up in recliner. Francisco Javier troy maintained to BLE. SCDs throughout the day when in bed. Denies further
needs at this time. Denies pain at this time.  Will report off to night
shift.

## 2020-05-09 NOTE — NUR
Report received. Assumed care for night shift. A&Ox to person/place but
conversation very confused. Assessment complete. VS have been stable. Denies
nasuea. States she gets a "little winded" when ambulating but no shortness of
breath at rest. Rating pain 4/10 to right hip-described as intermittent ache.
States she does not need pain medication at this time. Fresh ice pack applied
to site but immediately removed stating it was to cold and causes more pain.
Right hip dressing CDI true. Pillow under for support. Ambulated to
bathroom with one assist. Ambulation has greatly improved from last night
shift. TEDs/SCDs bilat. Call light in reach/bed alarm on. Will monitor.

## 2020-05-09 NOTE — NUR
ZUNILDA faxed over COVID results and updates for 5/8/2020-5/9/2020 to [facility].

## 2020-05-10 VITALS — SYSTOLIC BLOOD PRESSURE: 152 MMHG | HEART RATE: 88 BPM | TEMPERATURE: 98.3 F | DIASTOLIC BLOOD PRESSURE: 61 MMHG

## 2020-05-10 VITALS — SYSTOLIC BLOOD PRESSURE: 95 MMHG | HEART RATE: 72 BPM | TEMPERATURE: 97.6 F | DIASTOLIC BLOOD PRESSURE: 80 MMHG

## 2020-05-10 NOTE — NUR
Discharge report called to recieving facility. INT removed, catheter intact,
hemostasis achieved. Patient dressed and belongings gathered, patient
transferred to recieving facility transportation.

## 2020-05-10 NOTE — NUR
Patient resting in bed at this time. Patient rouses easily and is alert and
plesently confused per her baseline while awake. Patient denies pain or needs
at this time, call light within reach.

## 2020-05-10 NOTE — NUR
ZUNILDA made away of patient statues. DC orders prepared and faxed to [facility] [phone]
[phone].
Awaiting transport time to [facility].

## 2020-08-09 ENCOUNTER — HOSPITAL ENCOUNTER (EMERGENCY)
Dept: HOSPITAL 19 - COL.ER | Age: 85
Discharge: HOME | End: 2020-08-09
Payer: MEDICARE

## 2020-08-09 VITALS — HEART RATE: 66 BPM | SYSTOLIC BLOOD PRESSURE: 129 MMHG | DIASTOLIC BLOOD PRESSURE: 81 MMHG

## 2020-08-09 VITALS — WEIGHT: 130.29 LBS | BODY MASS INDEX: 20.45 KG/M2 | HEIGHT: 67.01 IN

## 2020-08-09 VITALS — TEMPERATURE: 98.3 F
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
## 2020-08-09 DIAGNOSIS — F02.80: ICD-10-CM
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
## 2020-08-09 DIAGNOSIS — S00.81XA: ICD-10-CM

## 2020-08-09 DIAGNOSIS — J98.11: ICD-10-CM

## 2020-08-09 DIAGNOSIS — G30.9: ICD-10-CM

## 2020-08-09 DIAGNOSIS — Z88.6: ICD-10-CM

## 2020-08-09 DIAGNOSIS — S09.90XA: Primary | ICD-10-CM

## 2020-08-09 LAB
ALBUMIN SERPL-MCNC: 4.4 GM/DL (ref 3.5–5)
ALP SERPL-CCNC: 115 U/L (ref 50–136)
ALT SERPL-CCNC: 16 U/L (ref 4–34)
ANION GAP SERPL CALC-SCNC: 9 MMOL/L (ref 7–16)
AST SERPL-CCNC: 30 U/L (ref 15–37)
BASOPHILS # BLD: 0 10*3/UL (ref 0–0.2)
BASOPHILS NFR BLD AUTO: 0.7 % (ref 0–2)
BILIRUB SERPL-MCNC: 0.6 MG/DL (ref 0–1)
BUN SERPL-MCNC: 14 MG/DL (ref 7–17)
CALCIUM SERPL-MCNC: 9.4 MG/DL (ref 8.4–10.2)
CHLORIDE SERPL-SCNC: 98 MMOL/L (ref 98–107)
CO2 SERPL-SCNC: 31 MMOL/L (ref 22–30)
CREAT SERPL-SCNC: 0.72 UMOL/L (ref 0.52–1.25)
EOSINOPHIL # BLD: 0.1 10*3/UL (ref 0–0.7)
EOSINOPHIL NFR BLD: 1.6 % (ref 0–4)
ERYTHROCYTE [DISTWIDTH] IN BLOOD BY AUTOMATED COUNT: 12.4 % (ref 11.5–14.5)
GLUCOSE SERPL-MCNC: 92 MG/DL (ref 74–106)
GRANULOCYTES # BLD AUTO: 67.4 % (ref 42.2–75.2)
HCT VFR BLD AUTO: 39.1 % (ref 37–47)
HGB BLD-MCNC: 12.6 G/DL (ref 12.5–16)
LYMPHOCYTES # BLD: 1 10*3/UL (ref 1.2–3.4)
LYMPHOCYTES NFR BLD: 23.3 % (ref 20–51)
MCH RBC QN AUTO: 32 PG (ref 27–31)
MCHC RBC AUTO-ENTMCNC: 32 G/DL (ref 33–37)
MCV RBC AUTO: 100 FL (ref 80–100)
MONOCYTES # BLD: 0.3 10*3/UL (ref 0.1–0.6)
MONOCYTES NFR BLD AUTO: 6.8 % (ref 1.7–9.3)
NEUTROPHILS # BLD: 2.9 10*3/UL (ref 1.4–6.5)
PLATELET # BLD AUTO: 189 K/MM3 (ref 130–400)
PMV BLD AUTO: 9.4 FL (ref 7.4–10.4)
POTASSIUM SERPL-SCNC: 4.3 MMOL/L (ref 3.4–5)
PROT SERPL-MCNC: 7.6 GM/DL (ref 6.4–8.2)
RBC # BLD AUTO: 3.93 M/MM3 (ref 4.1–5.3)
SODIUM SERPL-SCNC: 138 MMOL/L (ref 137–145)

## 2020-10-27 ENCOUNTER — HOSPITAL ENCOUNTER (OUTPATIENT)
Dept: HOSPITAL 19 - COL.CARD | Age: 85
End: 2020-10-27
Attending: FAMILY MEDICINE
Payer: MEDICARE

## 2020-10-27 DIAGNOSIS — R55: Primary | ICD-10-CM

## 2020-11-24 ENCOUNTER — HOSPITAL ENCOUNTER (EMERGENCY)
Dept: HOSPITAL 19 - COL.ER | Age: 85
Discharge: HOME | End: 2020-11-24
Attending: FAMILY MEDICINE
Payer: MEDICARE

## 2020-11-24 VITALS — BODY MASS INDEX: 22.24 KG/M2 | HEIGHT: 64.02 IN | WEIGHT: 130.29 LBS

## 2020-11-24 VITALS — DIASTOLIC BLOOD PRESSURE: 92 MMHG | SYSTOLIC BLOOD PRESSURE: 145 MMHG | HEART RATE: 79 BPM

## 2020-11-24 VITALS — TEMPERATURE: 99.1 F

## 2020-11-24 DIAGNOSIS — Z90.710: ICD-10-CM

## 2020-11-24 DIAGNOSIS — Z90.89: ICD-10-CM

## 2020-11-24 DIAGNOSIS — I10: ICD-10-CM

## 2020-11-24 DIAGNOSIS — F32.9: ICD-10-CM

## 2020-11-24 DIAGNOSIS — S01.01XA: Primary | ICD-10-CM

## 2020-11-24 DIAGNOSIS — K21.9: ICD-10-CM

## 2020-11-24 DIAGNOSIS — F41.9: ICD-10-CM

## 2020-11-24 DIAGNOSIS — S50.02XA: ICD-10-CM
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
## 2020-11-24 LAB
BASOPHILS # BLD: 0 10*3/UL (ref 0–0.2)
BASOPHILS NFR BLD AUTO: 0.7 % (ref 0–2)
EOSINOPHIL # BLD: 0.1 10*3/UL (ref 0–0.7)
EOSINOPHIL NFR BLD: 1.3 % (ref 0–4)
ERYTHROCYTE [DISTWIDTH] IN BLOOD BY AUTOMATED COUNT: 12.2 % (ref 11.5–14.5)
GRANULOCYTES # BLD AUTO: 67.6 % (ref 42.2–75.2)
HCT VFR BLD AUTO: 34.1 % (ref 37–47)
HGB BLD-MCNC: 11.6 G/DL (ref 12.5–16)
INR BLD: 1 (ref 0.8–3)
LYMPHOCYTES # BLD: 1 10*3/UL (ref 1.2–3.4)
LYMPHOCYTES NFR BLD: 22.5 % (ref 20–51)
MCH RBC QN AUTO: 33 PG (ref 27–31)
MCHC RBC AUTO-ENTMCNC: 34 G/DL (ref 33–37)
MCV RBC AUTO: 96 FL (ref 80–100)
MONOCYTES # BLD: 0.4 10*3/UL (ref 0.1–0.6)
MONOCYTES NFR BLD AUTO: 7.7 % (ref 1.7–9.3)
NEUTROPHILS # BLD: 3.1 10*3/UL (ref 1.4–6.5)
PLATELET # BLD AUTO: 188 K/MM3 (ref 130–400)
PMV BLD AUTO: 8.8 FL (ref 7.4–10.4)
PROTHROMBIN TIME: 11.4 SECONDS (ref 9.7–12.8)
RBC # BLD AUTO: 3.54 M/MM3 (ref 4.1–5.3)

## 2021-02-12 ENCOUNTER — HOSPITAL ENCOUNTER (INPATIENT)
Dept: HOSPITAL 19 - COL.ER | Age: 86
LOS: 10 days | Discharge: HOSPICE | DRG: 870 | End: 2021-02-22
Attending: INTERNAL MEDICINE | Admitting: INTERNAL MEDICINE
Payer: MEDICARE

## 2021-02-12 VITALS — OXYGEN SATURATION: 100 %

## 2021-02-12 VITALS — BODY MASS INDEX: 22.32 KG/M2 | HEIGHT: 64.02 IN | WEIGHT: 130.73 LBS

## 2021-02-12 DIAGNOSIS — E46: ICD-10-CM

## 2021-02-12 DIAGNOSIS — G93.49: ICD-10-CM

## 2021-02-12 DIAGNOSIS — D53.9: ICD-10-CM

## 2021-02-12 DIAGNOSIS — F03.90: ICD-10-CM

## 2021-02-12 DIAGNOSIS — Z51.5: ICD-10-CM

## 2021-02-12 DIAGNOSIS — K21.9: ICD-10-CM

## 2021-02-12 DIAGNOSIS — I48.0: ICD-10-CM

## 2021-02-12 DIAGNOSIS — R73.9: ICD-10-CM

## 2021-02-12 DIAGNOSIS — F32.9: ICD-10-CM

## 2021-02-12 DIAGNOSIS — I10: ICD-10-CM

## 2021-02-12 DIAGNOSIS — Z66: ICD-10-CM

## 2021-02-12 DIAGNOSIS — Z20.822: ICD-10-CM

## 2021-02-12 DIAGNOSIS — A41.9: Primary | ICD-10-CM

## 2021-02-12 DIAGNOSIS — I95.9: ICD-10-CM

## 2021-02-12 DIAGNOSIS — J18.9: ICD-10-CM

## 2021-02-12 DIAGNOSIS — R65.20: ICD-10-CM

## 2021-02-12 DIAGNOSIS — G89.29: ICD-10-CM

## 2021-02-12 DIAGNOSIS — M19.90: ICD-10-CM

## 2021-02-12 DIAGNOSIS — J96.01: ICD-10-CM

## 2021-02-12 DIAGNOSIS — F41.9: ICD-10-CM

## 2021-02-12 DIAGNOSIS — E87.6: ICD-10-CM

## 2021-02-12 LAB
ALBUMIN SERPL-MCNC: 3.7 GM/DL (ref 3.5–5)
ALP SERPL-CCNC: 84 U/L (ref 50–136)
ALT SERPL-CCNC: 16 U/L (ref 4–34)
ANION GAP SERPL CALC-SCNC: 6 MMOL/L (ref 7–16)
AST SERPL-CCNC: 23 U/L (ref 15–37)
BASE EXCESS BLDA CALC-SCNC: -4.7 MMOL/L (ref -2–2)
BILIRUB SERPL-MCNC: 0.7 MG/DL (ref 0–1)
BUN SERPL-MCNC: 21 MG/DL (ref 7–17)
CALCIUM SERPL-MCNC: 8.6 MG/DL (ref 8.4–10.2)
CHLORIDE SERPL-SCNC: 100 MMOL/L (ref 98–107)
CO2 BLDA-SCNC: 18.5 MMOL/L
CO2 SERPL-SCNC: 29 MMOL/L (ref 22–30)
CREAT SERPL-SCNC: 0.88 UMOL/L (ref 0.52–1.25)
EOSINOPHIL NFR BLD: 1 % (ref 0–4)
ERYTHROCYTE [DISTWIDTH] IN BLOOD BY AUTOMATED COUNT: 12.6 % (ref 11.5–14.5)
GLUCOSE SERPL-MCNC: 102 MG/DL (ref 74–106)
HCO3 BLDA-SCNC: 17.8 MEQ/L (ref 22–26)
HCT VFR BLD AUTO: 34 % (ref 37–47)
HGB BLD-MCNC: 10.9 G/DL (ref 12.5–16)
HYPOCHROMIA BLD QL SMEAR: (no result)
LYMPHOCYTES NFR BLD MANUAL: 6 % (ref 20–51)
MACROCYTES BLD QL SMEAR: (no result)
MCH RBC QN AUTO: 33 PG (ref 27–31)
MCHC RBC AUTO-ENTMCNC: 32 G/DL (ref 33–37)
MCV RBC AUTO: 102 FL (ref 80–100)
MONOCYTES NFR BLD: 3 % (ref 1.7–9.3)
NEUTS BAND NFR BLD: 2 % (ref 0–10)
NEUTS SEG NFR BLD MANUAL: 88 % (ref 42–75.2)
PCO2 BLDA: 22.8 MMHG (ref 35–45)
PH UR STRIP.AUTO: 6 [PH] (ref 5–8)
PLATELET # BLD AUTO: 192 K/MM3 (ref 130–400)
PLATELET BLD QL SMEAR: NORMAL
PMV BLD AUTO: 9.5 FL (ref 7.4–10.4)
PO2 BLDA: 48.1 MMHG (ref 80–100)
POTASSIUM SERPL-SCNC: 4.1 MMOL/L (ref 3.4–5)
PROT SERPL-MCNC: 6.6 GM/DL (ref 6.4–8.2)
RBC # BLD AUTO: 3.32 M/MM3 (ref 4.1–5.3)
RBC # UR: (no result) /HPF
SAO2 % BLDA: 88.6 % (ref 92–100)
SODIUM SERPL-SCNC: 136 MMOL/L (ref 137–145)
SP GR UR STRIP.AUTO: 1.02 (ref 1–1.03)
TROPONIN I SERPL-MCNC: < 0.012 NG/ML (ref 0–0.04)
UA DIPSTICK PNL UR STRIP.AUTO: (no result)
URN COLLECT METHOD CLASS: (no result)
UROBILINOGEN UR STRIP.AUTO-MCNC: 2 MG/DL

## 2021-02-12 PROCEDURE — 5A1955Z RESPIRATORY VENTILATION, GREATER THAN 96 CONSECUTIVE HOURS: ICD-10-PCS | Performed by: NURSE ANESTHETIST, CERTIFIED REGISTERED

## 2021-02-12 PROCEDURE — 0BH17EZ INSERTION OF ENDOTRACHEAL AIRWAY INTO TRACHEA, VIA NATURAL OR ARTIFICIAL OPENING: ICD-10-PCS | Performed by: NURSE ANESTHETIST, CERTIFIED REGISTERED

## 2021-02-12 PROCEDURE — A4314 CATH W/DRAINAGE 2-WAY LATEX: HCPCS

## 2021-02-13 VITALS — OXYGEN SATURATION: 98 %

## 2021-02-13 VITALS — OXYGEN SATURATION: 100 %

## 2021-02-13 VITALS — OXYGEN SATURATION: 99 %

## 2021-02-13 VITALS — OXYGEN SATURATION: 77 %

## 2021-02-13 VITALS
OXYGEN SATURATION: 98 % | TEMPERATURE: 98.4 F | SYSTOLIC BLOOD PRESSURE: 152 MMHG | HEART RATE: 53 BPM | DIASTOLIC BLOOD PRESSURE: 73 MMHG

## 2021-02-13 VITALS — OXYGEN SATURATION: 97 %

## 2021-02-13 VITALS — OXYGEN SATURATION: 94 %

## 2021-02-13 VITALS — OXYGEN SATURATION: 74 %

## 2021-02-13 VITALS — HEART RATE: 78 BPM | DIASTOLIC BLOOD PRESSURE: 60 MMHG | SYSTOLIC BLOOD PRESSURE: 118 MMHG | TEMPERATURE: 98.2 F

## 2021-02-13 VITALS
SYSTOLIC BLOOD PRESSURE: 92 MMHG | HEART RATE: 74 BPM | TEMPERATURE: 98.8 F | DIASTOLIC BLOOD PRESSURE: 49 MMHG | OXYGEN SATURATION: 100 %

## 2021-02-13 VITALS — OXYGEN SATURATION: 93 %

## 2021-02-13 VITALS — OXYGEN SATURATION: 96 %

## 2021-02-13 VITALS — OXYGEN SATURATION: 91 %

## 2021-02-13 VITALS — OXYGEN SATURATION: 83 %

## 2021-02-13 VITALS — OXYGEN SATURATION: 89 %

## 2021-02-13 VITALS — DIASTOLIC BLOOD PRESSURE: 60 MMHG | HEART RATE: 65 BPM | SYSTOLIC BLOOD PRESSURE: 132 MMHG | TEMPERATURE: 98.1 F

## 2021-02-13 VITALS — OXYGEN SATURATION: 95 %

## 2021-02-13 VITALS
DIASTOLIC BLOOD PRESSURE: 99 MMHG | TEMPERATURE: 98.2 F | SYSTOLIC BLOOD PRESSURE: 146 MMHG | HEART RATE: 86 BPM | OXYGEN SATURATION: 100 %

## 2021-02-13 VITALS — DIASTOLIC BLOOD PRESSURE: 59 MMHG | TEMPERATURE: 98.1 F | HEART RATE: 64 BPM | SYSTOLIC BLOOD PRESSURE: 110 MMHG

## 2021-02-13 VITALS — OXYGEN SATURATION: 90 %

## 2021-02-13 VITALS — OXYGEN SATURATION: 88 %

## 2021-02-13 VITALS — OXYGEN SATURATION: 76 %

## 2021-02-13 VITALS — OXYGEN SATURATION: 92 %

## 2021-02-13 VITALS — OXYGEN SATURATION: 81 %

## 2021-02-13 VITALS — OXYGEN SATURATION: 67 %

## 2021-02-13 LAB
ANION GAP SERPL CALC-SCNC: 6 MMOL/L (ref 7–16)
BASE EXCESS BLDA CALC-SCNC: -0.9 MMOL/L (ref -2–2)
BASE EXCESS BLDA CALC-SCNC: 2.7 MMOL/L (ref -2–2)
BASOPHILS # BLD: 0 10*3/UL (ref 0–0.2)
BASOPHILS NFR BLD AUTO: 0.2 % (ref 0–2)
BUN SERPL-MCNC: 17 MG/DL (ref 7–17)
CALCIUM SERPL-MCNC: 7.4 MG/DL (ref 8.4–10.2)
CHLORIDE SERPL-SCNC: 106 MMOL/L (ref 98–107)
CO2 BLDA-SCNC: 24.9 MMOL/L
CO2 BLDA-SCNC: 27.1 MMOL/L
CO2 SERPL-SCNC: 24 MMOL/L (ref 22–30)
CREAT SERPL-SCNC: 0.66 UMOL/L (ref 0.52–1.25)
EOSINOPHIL # BLD: 0 10*3/UL (ref 0–0.7)
EOSINOPHIL NFR BLD: 0.2 % (ref 0–4)
ERYTHROCYTE [DISTWIDTH] IN BLOOD BY AUTOMATED COUNT: 12.7 % (ref 11.5–14.5)
GLUCOSE SERPL-MCNC: 122 MG/DL (ref 74–106)
GRANULOCYTES # BLD AUTO: 86.1 % (ref 42.2–75.2)
HCO3 BLDA-SCNC: 23.7 MEQ/L (ref 22–26)
HCO3 BLDA-SCNC: 26 MEQ/L (ref 22–26)
HCT VFR BLD AUTO: 28.5 % (ref 37–47)
HGB BLD-MCNC: 9.2 G/DL (ref 12.5–16)
INHALED O2 CONCENTRATION: 100 %
INHALED O2 CONCENTRATION: 50 %
INR BLD: 1.2 (ref 0.8–3)
LYMPHOCYTES # BLD: 0.6 10*3/UL (ref 1.2–3.4)
LYMPHOCYTES NFR BLD: 6 % (ref 20–51)
MAGNESIUM SERPL-MCNC: 1.9 MG/DL (ref 1.6–2.3)
MCH RBC QN AUTO: 32 PG (ref 27–31)
MCHC RBC AUTO-ENTMCNC: 32 G/DL (ref 33–37)
MCV RBC AUTO: 99 FL (ref 80–100)
MONOCYTES # BLD: 0.7 10*3/UL (ref 0.1–0.6)
MONOCYTES NFR BLD AUTO: 6.6 % (ref 1.7–9.3)
NEUTROPHILS # BLD: 9 10*3/UL (ref 1.4–6.5)
PCO2 BLDA: 35.4 MMHG (ref 35–45)
PCO2 BLDA: 39 MMHG (ref 35–45)
PHOSPHATE SERPL-MCNC: 2.5 MG/DL (ref 2.5–4.5)
PLATELET # BLD AUTO: 159 K/MM3 (ref 130–400)
PMV BLD AUTO: 9.1 FL (ref 7.4–10.4)
PO2 BLDA: 206 MMHG (ref 80–100)
PO2 BLDA: 77.1 MMHG (ref 80–100)
POTASSIUM SERPL-SCNC: 3.5 MMOL/L (ref 3.4–5)
PROTHROMBIN TIME: 13.8 SECONDS (ref 9.7–12.8)
RBC # BLD AUTO: 2.88 M/MM3 (ref 4.1–5.3)
SAO2 % BLDA: 95.3 % (ref 92–100)
SAO2 % BLDA: 99.8 % (ref 92–100)
SODIUM SERPL-SCNC: 136 MMOL/L (ref 137–145)

## 2021-02-13 NOTE — NUR
Recieved report from Denise IRWIN. Lines and tubes verified with first entry to
room after report. Dr. Kohler here to see pt. care plan reviewed with DR. Kohler. New orders recieved and discussed.

## 2021-02-13 NOTE — NUR
Patient arrived to the ICU at midnight. She was stable on her vent with
slighly low blood pressure. Her blood pressure was trending down and I called
Soraida RENTERIA when it was 96/48. She reccomended a 500 ml/hr fluid bolus x2.
The patient is recieving the first 500 and blood pressure is currently 126/54.
She has propofol and fentanyl running. Antibiotics has already been started.
RSV and urine specimens were sent to lab. She is still being treated as a PUI.
She is a DNR. OG tube is hooked to low intermittant suction.
 
Dr Jeremías Christianson intercomed in and I gave him a quick update. His orders will be
put in the patients chart.

## 2021-02-13 NOTE — NUR
Son called to speak with patient at this time. Allowed Son to talk to Mother
over speakerphone. Reiterated that he could talk to her, but she would not be
able to respond or talk back due to being on the ventilator.

## 2021-02-13 NOTE — NUR
daughter Natalya called. Update given. Questions answered. Allowed Natalya to
talk to her mother on speaker phone in the room.

## 2021-02-13 NOTE — NUR
stopped through ICU and patient was airborne precaution and on
ventilator so nothing needed at this time.

## 2021-02-13 NOTE — NUR
ZUNILDA spoke with OSBALDO Santo over the phone to conduct intake evaluation.
Patient lives with daughter Rony (P#[phone]) in [place].  Patient's
son Dipak Tiwari (P#[phone]) is patient's alternate DPOA-HC.  Patient
requires assistance with ADLS, and rony hires CMAs/CNAs through private pay
to assist in caregiving.  Patient uses a walker for mobility presently.
Patient's PCP is Dr Vazquez, and she uses [facility] for
medications.  Rony reports that plan is for patient to return home with her
upon discharge and resume care.  Rony confirmed understanding that this may
change if patient's needs increase, and she is accepting of this.  Rony
denies questions or concerns for SW at this time.  OSBALDO asked ZUNILDA to relay to
nursing staff that patient requires regular reassurance and reorientation to
stay calm.  This was relayed to nursing staff.  OSBALDO requested that she and
her brother be able to call daily and let their mother hear their voices.  ZUNILDA
relayed request to staff.  Social work will continue to follow as needs
progress.

## 2021-02-14 VITALS — OXYGEN SATURATION: 99 %

## 2021-02-14 VITALS — OXYGEN SATURATION: 90 %

## 2021-02-14 VITALS — OXYGEN SATURATION: 96 %

## 2021-02-14 VITALS — OXYGEN SATURATION: 98 %

## 2021-02-14 VITALS — OXYGEN SATURATION: 100 %

## 2021-02-14 VITALS — OXYGEN SATURATION: 95 %

## 2021-02-14 VITALS — OXYGEN SATURATION: 94 %

## 2021-02-14 VITALS — OXYGEN SATURATION: 93 %

## 2021-02-14 VITALS — OXYGEN SATURATION: 97 %

## 2021-02-14 VITALS — OXYGEN SATURATION: 73 %

## 2021-02-14 VITALS — TEMPERATURE: 97.4 F | DIASTOLIC BLOOD PRESSURE: 77 MMHG | SYSTOLIC BLOOD PRESSURE: 163 MMHG | HEART RATE: 47 BPM

## 2021-02-14 VITALS — OXYGEN SATURATION: 79 %

## 2021-02-14 VITALS — OXYGEN SATURATION: 80 %

## 2021-02-14 VITALS — OXYGEN SATURATION: 56 %

## 2021-02-14 VITALS — OXYGEN SATURATION: 65 %

## 2021-02-14 VITALS — TEMPERATURE: 98.2 F | SYSTOLIC BLOOD PRESSURE: 116 MMHG | HEART RATE: 78 BPM | DIASTOLIC BLOOD PRESSURE: 80 MMHG

## 2021-02-14 VITALS — OXYGEN SATURATION: 72 %

## 2021-02-14 VITALS — HEART RATE: 78 BPM | TEMPERATURE: 97.9 F | DIASTOLIC BLOOD PRESSURE: 66 MMHG | SYSTOLIC BLOOD PRESSURE: 115 MMHG

## 2021-02-14 VITALS — OXYGEN SATURATION: 66 %

## 2021-02-14 VITALS — HEART RATE: 54 BPM | DIASTOLIC BLOOD PRESSURE: 75 MMHG | TEMPERATURE: 98.8 F | SYSTOLIC BLOOD PRESSURE: 157 MMHG

## 2021-02-14 VITALS — OXYGEN SATURATION: 89 %

## 2021-02-14 VITALS — DIASTOLIC BLOOD PRESSURE: 75 MMHG | SYSTOLIC BLOOD PRESSURE: 149 MMHG | HEART RATE: 52 BPM | TEMPERATURE: 97.6 F

## 2021-02-14 VITALS — OXYGEN SATURATION: 68 %

## 2021-02-14 VITALS — OXYGEN SATURATION: 67 %

## 2021-02-14 VITALS — OXYGEN SATURATION: 88 %

## 2021-02-14 VITALS — OXYGEN SATURATION: 63 %

## 2021-02-14 VITALS — OXYGEN SATURATION: 81 %

## 2021-02-14 VITALS — OXYGEN SATURATION: 69 %

## 2021-02-14 VITALS — OXYGEN SATURATION: 77 %

## 2021-02-14 LAB
ANION GAP SERPL CALC-SCNC: 5 MMOL/L (ref 7–16)
BASE EXCESS BLDA CALC-SCNC: -0.1 MMOL/L (ref -2–2)
BASE EXCESS BLDA CALC-SCNC: -5.3 MMOL/L (ref -2–2)
BASOPHILS # BLD: 0 10*3/UL (ref 0–0.2)
BASOPHILS NFR BLD AUTO: 0 % (ref 0–2)
BUN SERPL-MCNC: 17 MG/DL (ref 7–17)
CALCIUM SERPL-MCNC: 7.6 MG/DL (ref 8.4–10.2)
CHLORIDE SERPL-SCNC: 108 MMOL/L (ref 98–107)
CO2 BLDA-SCNC: 22.3 MMOL/L
CO2 BLDA-SCNC: 22.5 MMOL/L
CO2 SERPL-SCNC: 24 MMOL/L (ref 22–30)
CREAT SERPL-SCNC: 0.54 UMOL/L (ref 0.52–1.25)
EOSINOPHIL # BLD: 0 10*3/UL (ref 0–0.7)
EOSINOPHIL NFR BLD: 0 % (ref 0–4)
ERYTHROCYTE [DISTWIDTH] IN BLOOD BY AUTOMATED COUNT: 12.5 % (ref 11.5–14.5)
GLUCOSE SERPL-MCNC: 217 MG/DL (ref 74–106)
GRANULOCYTES # BLD AUTO: 88.8 % (ref 42.2–75.2)
HCO3 BLDA-SCNC: 20.9 MEQ/L (ref 22–26)
HCO3 BLDA-SCNC: 21.7 MEQ/L (ref 22–26)
HCT VFR BLD AUTO: 27.2 % (ref 37–47)
HGB BLD-MCNC: 8.9 G/DL (ref 12.5–16)
INHALED O2 CONCENTRATION: 30 %
INHALED O2 CONCENTRATION: 30 %
LYMPHOCYTES # BLD: 0.2 10*3/UL (ref 1.2–3.4)
LYMPHOCYTES NFR BLD: 3.8 % (ref 20–51)
MAGNESIUM SERPL-MCNC: 2.3 MG/DL (ref 1.6–2.3)
MCH RBC QN AUTO: 32 PG (ref 27–31)
MCHC RBC AUTO-ENTMCNC: 33 G/DL (ref 33–37)
MCV RBC AUTO: 97 FL (ref 80–100)
MONOCYTES # BLD: 0.3 10*3/UL (ref 0.1–0.6)
MONOCYTES NFR BLD AUTO: 6.7 % (ref 1.7–9.3)
NEUTROPHILS # BLD: 4 10*3/UL (ref 1.4–6.5)
PCO2 BLDA: 27.1 MMHG (ref 35–45)
PCO2 BLDA: 43.9 MMHG (ref 35–45)
PHOSPHATE SERPL-MCNC: 2.1 MG/DL (ref 2.5–4.5)
PLATELET # BLD AUTO: 161 K/MM3 (ref 130–400)
PMV BLD AUTO: 9.4 FL (ref 7.4–10.4)
PO2 BLDA: 103.7 MMHG (ref 80–100)
PO2 BLDA: 81.9 MMHG (ref 80–100)
POTASSIUM SERPL-SCNC: 3.4 MMOL/L (ref 3.4–5)
RBC # BLD AUTO: 2.81 M/MM3 (ref 4.1–5.3)
SAO2 % BLDA: 96.9 % (ref 92–100)
SAO2 % BLDA: 97.2 % (ref 92–100)
SODIUM SERPL-SCNC: 136 MMOL/L (ref 137–145)

## 2021-02-14 NOTE — NUR
RECEIVED REPORT FROM DC DE GUZMAN. PT RESTING EASILY ON CURRENT VENT SETTINGS: TV
400, PEEP 5, FIO2 30%, RR 18. FC PATENT AND DRAINING TO GRAVITY. VSS. BSW IN
PLACE. OGT IN PLACE WITH TF INFUSING AT 30 ML/HR. SEE GTT FLOWSHEET. PT OPENS
EYES AND LOOKS AROUND WHEN SPOKEN TO.

## 2021-02-15 VITALS — OXYGEN SATURATION: 94 %

## 2021-02-15 VITALS — OXYGEN SATURATION: 95 %

## 2021-02-15 VITALS — OXYGEN SATURATION: 93 %

## 2021-02-15 VITALS — OXYGEN SATURATION: 96 %

## 2021-02-15 VITALS
DIASTOLIC BLOOD PRESSURE: 51 MMHG | TEMPERATURE: 98 F | SYSTOLIC BLOOD PRESSURE: 107 MMHG | OXYGEN SATURATION: 92 % | HEART RATE: 65 BPM

## 2021-02-15 VITALS — OXYGEN SATURATION: 92 %

## 2021-02-15 VITALS
OXYGEN SATURATION: 94 % | DIASTOLIC BLOOD PRESSURE: 53 MMHG | TEMPERATURE: 97.5 F | HEART RATE: 74 BPM | SYSTOLIC BLOOD PRESSURE: 122 MMHG

## 2021-02-15 VITALS — OXYGEN SATURATION: 84 %

## 2021-02-15 VITALS — HEART RATE: 79 BPM | DIASTOLIC BLOOD PRESSURE: 52 MMHG | TEMPERATURE: 98.3 F | SYSTOLIC BLOOD PRESSURE: 118 MMHG

## 2021-02-15 VITALS — OXYGEN SATURATION: 98 %

## 2021-02-15 VITALS — OXYGEN SATURATION: 97 %

## 2021-02-15 VITALS — OXYGEN SATURATION: 85 %

## 2021-02-15 VITALS — HEART RATE: 71 BPM | TEMPERATURE: 98.2 F | DIASTOLIC BLOOD PRESSURE: 48 MMHG | SYSTOLIC BLOOD PRESSURE: 108 MMHG

## 2021-02-15 VITALS
TEMPERATURE: 98.2 F | SYSTOLIC BLOOD PRESSURE: 132 MMHG | OXYGEN SATURATION: 95 % | DIASTOLIC BLOOD PRESSURE: 60 MMHG | HEART RATE: 67 BPM

## 2021-02-15 VITALS
HEART RATE: 78 BPM | OXYGEN SATURATION: 95 % | DIASTOLIC BLOOD PRESSURE: 54 MMHG | TEMPERATURE: 98.5 F | SYSTOLIC BLOOD PRESSURE: 109 MMHG

## 2021-02-15 VITALS — OXYGEN SATURATION: 100 %

## 2021-02-15 VITALS — OXYGEN SATURATION: 88 %

## 2021-02-15 VITALS — OXYGEN SATURATION: 99 %

## 2021-02-15 LAB
ANION GAP SERPL CALC-SCNC: 3 MMOL/L (ref 7–16)
BASE EXCESS BLDA CALC-SCNC: 1.1 MMOL/L (ref -2–2)
BASE EXCESS BLDA CALC-SCNC: 1.5 MMOL/L (ref -2–2)
BASOPHILS # BLD: 0 10*3/UL (ref 0–0.2)
BASOPHILS NFR BLD AUTO: 0.1 % (ref 0–2)
BUN SERPL-MCNC: 27 MG/DL (ref 7–17)
CALCIUM SERPL-MCNC: 7.7 MG/DL (ref 8.4–10.2)
CHLORIDE SERPL-SCNC: 111 MMOL/L (ref 98–107)
CO2 BLDA-SCNC: 25.6 MMOL/L
CO2 SERPL-SCNC: 27 MMOL/L (ref 22–30)
CREAT SERPL-SCNC: 0.63 UMOL/L (ref 0.52–1.25)
EOSINOPHIL # BLD: 0 10*3/UL (ref 0–0.7)
EOSINOPHIL NFR BLD: 0 % (ref 0–4)
ERYTHROCYTE [DISTWIDTH] IN BLOOD BY AUTOMATED COUNT: 12.9 % (ref 11.5–14.5)
GLUCOSE SERPL-MCNC: 169 MG/DL (ref 74–106)
GRANULOCYTES # BLD AUTO: 89.1 % (ref 42.2–75.2)
HCO3 BLDA-SCNC: 24.5 MEQ/L (ref 22–26)
HCO3 BLDA-SCNC: 25.8 MEQ/L (ref 22–26)
HCT VFR BLD AUTO: 27.3 % (ref 37–47)
HGB BLD-MCNC: 8.9 G/DL (ref 12.5–16)
INHALED O2 CONCENTRATION: 30 %
INHALED O2 CONCENTRATION: 30 %
LYMPHOCYTES # BLD: 0.2 10*3/UL (ref 1.2–3.4)
LYMPHOCYTES NFR BLD: 2.7 % (ref 20–51)
MAGNESIUM SERPL-MCNC: 2.4 MG/DL (ref 1.6–2.3)
MCH RBC QN AUTO: 32 PG (ref 27–31)
MCHC RBC AUTO-ENTMCNC: 33 G/DL (ref 33–37)
MCV RBC AUTO: 98 FL (ref 80–100)
MONOCYTES # BLD: 0.6 10*3/UL (ref 0.1–0.6)
MONOCYTES NFR BLD AUTO: 6.8 % (ref 1.7–9.3)
NEUTROPHILS # BLD: 7.9 10*3/UL (ref 1.4–6.5)
PCO2 BLDA: 34.5 MMHG (ref 35–45)
PCO2 BLDA: 39.7 MMHG (ref 35–45)
PHOSPHATE SERPL-MCNC: 2.5 MG/DL (ref 2.5–4.5)
PLATELET # BLD AUTO: 199 K/MM3 (ref 130–400)
PMV BLD AUTO: 9.3 FL (ref 7.4–10.4)
PO2 BLDA: 75.2 MMHG (ref 80–100)
PO2 BLDA: 77.1 MMHG (ref 80–100)
POTASSIUM SERPL-SCNC: 4.2 MMOL/L (ref 3.4–5)
PRE ALBUMIN: 12.6 MG/DL (ref 17.6–36)
RBC # BLD AUTO: 2.8 M/MM3 (ref 4.1–5.3)
SAO2 % BLDA: 95.4 % (ref 92–100)
SAO2 % BLDA: 95.7 % (ref 92–100)
SODIUM SERPL-SCNC: 141 MMOL/L (ref 137–145)

## 2021-02-15 NOTE — NUR
Patient was very agitated this morining during oral care, she had an otherwise
very peaceful and uneventful night. The propofol remained at 30 and fentanyl
at 75. Since she was very awake and even sitting completely up and agitated we
decided to do the breathing trials then and there. RT Nga said the patients
volumes were satisfactory but she wasn't iniating enough breaths, it was
around 9 or 10 breaths a minute. I then decided to lower the sedation by half
as the patient was calming from her agitation but she remained 9 or 10 breaths
a minute. She is resting now, her vitals are stable.

## 2021-02-15 NOTE — NUR
Received bedside report from DC Tan. All medications verified and all
questions answered. Patient on ventilator, review ventilator settings with day
shift RN. VSS. Will resume care at this time.

## 2021-02-16 VITALS — OXYGEN SATURATION: 96 %

## 2021-02-16 VITALS — OXYGEN SATURATION: 95 %

## 2021-02-16 VITALS — OXYGEN SATURATION: 98 %

## 2021-02-16 VITALS — OXYGEN SATURATION: 99 %

## 2021-02-16 VITALS
SYSTOLIC BLOOD PRESSURE: 133 MMHG | HEART RATE: 68 BPM | DIASTOLIC BLOOD PRESSURE: 61 MMHG | OXYGEN SATURATION: 94 % | TEMPERATURE: 97.8 F

## 2021-02-16 VITALS — OXYGEN SATURATION: 97 %

## 2021-02-16 VITALS — OXYGEN SATURATION: 94 %

## 2021-02-16 VITALS — OXYGEN SATURATION: 92 %

## 2021-02-16 VITALS — OXYGEN SATURATION: 93 %

## 2021-02-16 VITALS — SYSTOLIC BLOOD PRESSURE: 126 MMHG | TEMPERATURE: 97.4 F | DIASTOLIC BLOOD PRESSURE: 60 MMHG | HEART RATE: 62 BPM

## 2021-02-16 VITALS — SYSTOLIC BLOOD PRESSURE: 158 MMHG | TEMPERATURE: 98.1 F | HEART RATE: 84 BPM | DIASTOLIC BLOOD PRESSURE: 72 MMHG

## 2021-02-16 VITALS — OXYGEN SATURATION: 100 %

## 2021-02-16 VITALS — TEMPERATURE: 97.5 F | SYSTOLIC BLOOD PRESSURE: 147 MMHG | HEART RATE: 74 BPM | DIASTOLIC BLOOD PRESSURE: 76 MMHG

## 2021-02-16 VITALS
TEMPERATURE: 99 F | HEART RATE: 71 BPM | OXYGEN SATURATION: 100 % | DIASTOLIC BLOOD PRESSURE: 63 MMHG | SYSTOLIC BLOOD PRESSURE: 140 MMHG

## 2021-02-16 VITALS — DIASTOLIC BLOOD PRESSURE: 67 MMHG | HEART RATE: 61 BPM | SYSTOLIC BLOOD PRESSURE: 149 MMHG | TEMPERATURE: 98.4 F

## 2021-02-16 VITALS — OXYGEN SATURATION: 73 %

## 2021-02-16 VITALS — OXYGEN SATURATION: 77 %

## 2021-02-16 LAB
ANION GAP SERPL CALC-SCNC: 1 MMOL/L (ref 7–16)
BASE EXCESS BLDA CALC-SCNC: 2.4 MMOL/L (ref -2–2)
BUN SERPL-MCNC: 30 MG/DL (ref 7–17)
CALCIUM SERPL-MCNC: 8 MG/DL (ref 8.4–10.2)
CHLORIDE SERPL-SCNC: 107 MMOL/L (ref 98–107)
CO2 SERPL-SCNC: 32 MMOL/L (ref 22–30)
CREAT SERPL-SCNC: 0.59 UMOL/L (ref 0.52–1.25)
ERYTHROCYTE [DISTWIDTH] IN BLOOD BY AUTOMATED COUNT: 13.1 % (ref 11.5–14.5)
GLUCOSE SERPL-MCNC: 185 MG/DL (ref 74–106)
HCO3 BLDA-SCNC: 27 MEQ/L (ref 22–26)
HCT VFR BLD AUTO: 28.3 % (ref 37–47)
HGB BLD-MCNC: 9.1 G/DL (ref 12.5–16)
INHALED O2 CONCENTRATION: 30 %
LYMPHOCYTES NFR BLD MANUAL: 10 % (ref 20–51)
MACROCYTES BLD QL SMEAR: (no result)
MCH RBC QN AUTO: 32 PG (ref 27–31)
MCHC RBC AUTO-ENTMCNC: 32 G/DL (ref 33–37)
MCV RBC AUTO: 100 FL (ref 80–100)
MONOCYTES NFR BLD: 3 % (ref 1.7–9.3)
MYELOCYTES NFR BLD MANUAL: 1 % (ref 0–0)
NEUTS BAND NFR BLD: 5 % (ref 0–10)
NEUTS SEG NFR BLD MANUAL: 81 % (ref 42–75.2)
PCO2 BLDA: 41.9 MMHG (ref 35–45)
PLATELET # BLD AUTO: 214 K/MM3 (ref 130–400)
PLATELET BLD QL SMEAR: NORMAL
PMV BLD AUTO: 9.3 FL (ref 7.4–10.4)
PO2 BLDA: 81.3 MMHG (ref 80–100)
POTASSIUM SERPL-SCNC: 4.1 MMOL/L (ref 3.4–5)
RBC # BLD AUTO: 2.82 M/MM3 (ref 4.1–5.3)
SAO2 % BLDA: 96.2 % (ref 92–100)
SODIUM SERPL-SCNC: 140 MMOL/L (ref 137–145)

## 2021-02-16 NOTE — NUR
Started sedation vacation at 0515. Propofol and fentanyl decreased in half.
Propofol running at 5.6mls/hr and fentanyl running at 2.5mls/hr. Patient
became restless with VSS but was unable to follow commands. Sedation vacation
turned off at 0530.

## 2021-02-16 NOTE — NUR
While attempting weaning trial pt became restless and attempting to sit up. Pt
moves all extremeties but will not follow commands. Pt's eyes open but not
focusing on voice. Sedation increased. Will continue to monitor.

## 2021-02-16 NOTE — NUR
attended clinical rounds with the team and patient will not be
extubated today. SW contacted patient's daughter, Natalya to touch base and
introduce self. Natalya advised that Hospitalist has been keeping her updated
on patient. SW will continue to follow.

## 2021-02-16 NOTE — NUR
ATTEMPTED WEANING PARAMETERS WITH PTS VENT SETTINGS THIS MORNING BUT SHE DID
NOT BREATHE ON HER OWN.  RN WILL LOWER SEDATION EVEN MORE AND WE WILL TRY
AGAIN LATER.

## 2021-02-17 VITALS — OXYGEN SATURATION: 96 %

## 2021-02-17 VITALS — OXYGEN SATURATION: 99 %

## 2021-02-17 VITALS — OXYGEN SATURATION: 100 %

## 2021-02-17 VITALS — OXYGEN SATURATION: 95 %

## 2021-02-17 VITALS — OXYGEN SATURATION: 97 %

## 2021-02-17 VITALS — DIASTOLIC BLOOD PRESSURE: 42 MMHG | SYSTOLIC BLOOD PRESSURE: 84 MMHG | TEMPERATURE: 98.6 F | HEART RATE: 72 BPM

## 2021-02-17 VITALS — OXYGEN SATURATION: 80 %

## 2021-02-17 VITALS
OXYGEN SATURATION: 96 % | HEART RATE: 61 BPM | SYSTOLIC BLOOD PRESSURE: 157 MMHG | TEMPERATURE: 98 F | DIASTOLIC BLOOD PRESSURE: 65 MMHG

## 2021-02-17 VITALS — OXYGEN SATURATION: 98 %

## 2021-02-17 VITALS — HEART RATE: 73 BPM | TEMPERATURE: 98.6 F | SYSTOLIC BLOOD PRESSURE: 95 MMHG | DIASTOLIC BLOOD PRESSURE: 48 MMHG

## 2021-02-17 VITALS
SYSTOLIC BLOOD PRESSURE: 112 MMHG | OXYGEN SATURATION: 98 % | TEMPERATURE: 97.7 F | HEART RATE: 67 BPM | DIASTOLIC BLOOD PRESSURE: 52 MMHG

## 2021-02-17 VITALS
HEART RATE: 67 BPM | DIASTOLIC BLOOD PRESSURE: 74 MMHG | OXYGEN SATURATION: 96 % | TEMPERATURE: 98.8 F | SYSTOLIC BLOOD PRESSURE: 152 MMHG

## 2021-02-17 VITALS — OXYGEN SATURATION: 83 %

## 2021-02-17 VITALS — OXYGEN SATURATION: 90 %

## 2021-02-17 VITALS — OXYGEN SATURATION: 94 %

## 2021-02-17 VITALS — OXYGEN SATURATION: 72 %

## 2021-02-17 VITALS — OXYGEN SATURATION: 82 %

## 2021-02-17 VITALS — SYSTOLIC BLOOD PRESSURE: 114 MMHG | HEART RATE: 72 BPM | DIASTOLIC BLOOD PRESSURE: 52 MMHG | TEMPERATURE: 98.1 F

## 2021-02-17 VITALS — OXYGEN SATURATION: 88 %

## 2021-02-17 VITALS — OXYGEN SATURATION: 92 %

## 2021-02-17 VITALS — OXYGEN SATURATION: 85 %

## 2021-02-17 VITALS — OXYGEN SATURATION: 93 %

## 2021-02-17 LAB
ANION GAP SERPL CALC-SCNC: 2 MMOL/L (ref 7–16)
BASE EXCESS BLDA CALC-SCNC: 5.2 MMOL/L (ref -2–2)
BUN SERPL-MCNC: 23 MG/DL (ref 7–17)
CALCIUM SERPL-MCNC: 8.3 MG/DL (ref 8.4–10.2)
CHLORIDE SERPL-SCNC: 104 MMOL/L (ref 98–107)
CO2 BLDA-SCNC: 31.3 MMOL/L
CO2 SERPL-SCNC: 32 MMOL/L (ref 22–30)
CREAT SERPL-SCNC: 0.58 UMOL/L (ref 0.52–1.25)
ERYTHROCYTE [DISTWIDTH] IN BLOOD BY AUTOMATED COUNT: 12.9 % (ref 11.5–14.5)
GLUCOSE SERPL-MCNC: 129 MG/DL (ref 74–106)
HCO3 BLDA-SCNC: 29.9 MEQ/L (ref 22–26)
HCT VFR BLD AUTO: 28.9 % (ref 37–47)
HGB BLD-MCNC: 9.4 G/DL (ref 12.5–16)
HYPOCHROMIA BLD QL SMEAR: (no result)
INHALED O2 CONCENTRATION: 30 %
LYMPHOCYTES NFR BLD MANUAL: 21 % (ref 20–51)
MCH RBC QN AUTO: 32 PG (ref 27–31)
MCHC RBC AUTO-ENTMCNC: 33 G/DL (ref 33–37)
MCV RBC AUTO: 99 FL (ref 80–100)
MONOCYTES NFR BLD: 5 % (ref 1.7–9.3)
MYELOCYTES NFR BLD MANUAL: 1 % (ref 0–0)
NEUTS SEG NFR BLD MANUAL: 73 % (ref 42–75.2)
NRBC BLD AUTO-RTO: 1 % (ref 0–6)
PCO2 BLDA: 45 MMHG (ref 35–45)
PLATELET # BLD AUTO: 227 K/MM3 (ref 130–400)
PLATELET BLD QL SMEAR: NORMAL
PMV BLD AUTO: 8.8 FL (ref 7.4–10.4)
PO2 BLDA: 82.8 MMHG (ref 80–100)
POTASSIUM SERPL-SCNC: 3.4 MMOL/L (ref 3.4–5)
RBC # BLD AUTO: 2.93 M/MM3 (ref 4.1–5.3)
SAO2 % BLDA: 96.5 % (ref 92–100)
SODIUM SERPL-SCNC: 138 MMOL/L (ref 137–145)

## 2021-02-17 NOTE — NUR
I recieved report from Britney IRWIN. The patient's blood pressure was low, MAP
high 50's and low 60's. She said the patient recieved a 1,000 L bolus of NS
around 5PM. A few hours later, around 850PM the patient's blood pressure still
remains low, with a MAP around 65. It was dwindling down and the MAP was 57
and I started a Levo drip, I took one last pressure as the drip was starting
and the MAP was 52. I started the patient on 0.1mcg/kg/min and five minutes
later the blood pressure was 149/74. I am titrating accordingly. The last
blood pressure at 930pm is 108/55. All other vitals are stable and patient is
resting. Propofol is at 10 mcg/kg/min and Fentanyl is 50mcg/hr.

## 2021-02-17 NOTE — NUR
PLACED PT ON A WEAN TRIAL AND IMMEDIATLY KICKED INTO APNEA VENTILATON. PT
PLACED BACK ON DOCUMENTED SETTINGS THEODORE WELL WITH NO DISTRESS NOTED AT THIS
TIME

## 2021-02-17 NOTE — NUR
Patient had an uneventuful night. She is currently still sedated with propofol
and fentanyl. Jose L RT and I tried conducting the breathing trial but the
patient was apnic so RT stated the patient failed and resumed the vent. I will
relay this to dayshift. Otherwise, the patient's vitals are stable. She is
still on the tubefeeding at goal. She had one bowel movement.

## 2021-02-18 VITALS — HEART RATE: 84 BPM | TEMPERATURE: 98.8 F | SYSTOLIC BLOOD PRESSURE: 107 MMHG | DIASTOLIC BLOOD PRESSURE: 62 MMHG

## 2021-02-18 VITALS — OXYGEN SATURATION: 92 %

## 2021-02-18 VITALS
SYSTOLIC BLOOD PRESSURE: 131 MMHG | OXYGEN SATURATION: 94 % | HEART RATE: 120 BPM | TEMPERATURE: 99 F | DIASTOLIC BLOOD PRESSURE: 58 MMHG

## 2021-02-18 VITALS — OXYGEN SATURATION: 94 %

## 2021-02-18 VITALS — OXYGEN SATURATION: 96 %

## 2021-02-18 VITALS — OXYGEN SATURATION: 99 %

## 2021-02-18 VITALS — OXYGEN SATURATION: 95 %

## 2021-02-18 VITALS — OXYGEN SATURATION: 93 %

## 2021-02-18 VITALS — OXYGEN SATURATION: 98 %

## 2021-02-18 VITALS — OXYGEN SATURATION: 91 %

## 2021-02-18 VITALS — OXYGEN SATURATION: 97 %

## 2021-02-18 VITALS — SYSTOLIC BLOOD PRESSURE: 135 MMHG | HEART RATE: 98 BPM | DIASTOLIC BLOOD PRESSURE: 98 MMHG

## 2021-02-18 VITALS — OXYGEN SATURATION: 100 %

## 2021-02-18 VITALS
TEMPERATURE: 98.5 F | SYSTOLIC BLOOD PRESSURE: 101 MMHG | DIASTOLIC BLOOD PRESSURE: 50 MMHG | HEART RATE: 79 BPM | OXYGEN SATURATION: 96 %

## 2021-02-18 VITALS — SYSTOLIC BLOOD PRESSURE: 119 MMHG | DIASTOLIC BLOOD PRESSURE: 56 MMHG | TEMPERATURE: 99 F | HEART RATE: 98 BPM

## 2021-02-18 VITALS
HEART RATE: 99 BPM | SYSTOLIC BLOOD PRESSURE: 115 MMHG | DIASTOLIC BLOOD PRESSURE: 52 MMHG | OXYGEN SATURATION: 96 % | TEMPERATURE: 98.2 F

## 2021-02-18 VITALS — TEMPERATURE: 98 F | SYSTOLIC BLOOD PRESSURE: 106 MMHG | DIASTOLIC BLOOD PRESSURE: 44 MMHG | HEART RATE: 92 BPM

## 2021-02-18 VITALS — DIASTOLIC BLOOD PRESSURE: 98 MMHG | SYSTOLIC BLOOD PRESSURE: 168 MMHG | OXYGEN SATURATION: 94 % | HEART RATE: 120 BPM

## 2021-02-18 VITALS — OXYGEN SATURATION: 90 %

## 2021-02-18 LAB
ANION GAP SERPL CALC-SCNC: 2 MMOL/L (ref 7–16)
BASE EXCESS BLDA CALC-SCNC: 2.1 MMOL/L (ref -2–2)
BASOPHILS # BLD: 0 10*3/UL (ref 0–0.2)
BASOPHILS NFR BLD AUTO: 0.4 % (ref 0–2)
BUN SERPL-MCNC: 33 MG/DL (ref 7–17)
CALCIUM SERPL-MCNC: 8.1 MG/DL (ref 8.4–10.2)
CHLORIDE SERPL-SCNC: 107 MMOL/L (ref 98–107)
CO2 BLDA-SCNC: 29.1 MMOL/L
CO2 SERPL-SCNC: 30 MMOL/L (ref 22–30)
CREAT SERPL-SCNC: 0.64 UMOL/L (ref 0.52–1.25)
EOSINOPHIL # BLD: 0.3 10*3/UL (ref 0–0.7)
EOSINOPHIL NFR BLD: 2.9 % (ref 0–4)
ERYTHROCYTE [DISTWIDTH] IN BLOOD BY AUTOMATED COUNT: 13 % (ref 11.5–14.5)
GLUCOSE SERPL-MCNC: 112 MG/DL (ref 74–106)
GRANULOCYTES # BLD AUTO: 75.8 % (ref 42.2–75.2)
HCO3 BLDA-SCNC: 27.6 MEQ/L (ref 22–26)
HCT VFR BLD AUTO: 30.1 % (ref 37–47)
HGB BLD-MCNC: 9.4 G/DL (ref 12.5–16)
HYPOCHROMIA BLD QL SMEAR: (no result)
INHALED O2 CONCENTRATION: 30 %
LYMPHOCYTES # BLD: 1 10*3/UL (ref 1.2–3.4)
LYMPHOCYTES NFR BLD MANUAL: 15 % (ref 20–51)
LYMPHOCYTES NFR BLD: 9.3 % (ref 20–51)
MACROCYTES BLD QL SMEAR: (no result)
MAGNESIUM SERPL-MCNC: 2.4 MG/DL (ref 1.6–2.3)
MCH RBC QN AUTO: 32 PG (ref 27–31)
MCHC RBC AUTO-ENTMCNC: 31 G/DL (ref 33–37)
MCV RBC AUTO: 102 FL (ref 80–100)
MONOCYTES # BLD: 0.6 10*3/UL (ref 0.1–0.6)
MONOCYTES NFR BLD AUTO: 5.2 % (ref 1.7–9.3)
MONOCYTES NFR BLD: 4 % (ref 1.7–9.3)
NEUTROPHILS # BLD: 8.4 10*3/UL (ref 1.4–6.5)
NEUTS BAND NFR BLD: 9 % (ref 0–10)
NEUTS SEG NFR BLD MANUAL: 72 % (ref 42–75.2)
OVALOCYTES BLD QL SMEAR: (no result)
PCO2 BLDA: 47.7 MMHG (ref 35–45)
PLATELET # BLD AUTO: 267 K/MM3 (ref 130–400)
PLATELET BLD QL SMEAR: NORMAL
PMV BLD AUTO: 9.1 FL (ref 7.4–10.4)
PO2 BLDA: 72.4 MMHG (ref 80–100)
POTASSIUM SERPL-SCNC: 4.3 MMOL/L (ref 3.4–5)
RBC # BLD AUTO: 2.95 M/MM3 (ref 4.1–5.3)
SAO2 % BLDA: 94.4 % (ref 92–100)
SODIUM SERPL-SCNC: 139 MMOL/L (ref 137–145)
TRIGL SERPL-MCNC: 87 MG/DL

## 2021-02-18 NOTE — NUR
At 0530 I held the propofol and fentanyl, patient is starting to wake up and
get agitated. I held her hand and tried to comfort her and reorintate her. As
per the orders, the breathing trial will start at 0700. Levophed is also being
held as her blood pressure increased, possibly from with holding her sedation
or possibly from her agitation. All vitals are stable.

## 2021-02-18 NOTE — NUR
PT HAD BEEN OFF SEDATION FROM 0530 UNTIL ABOUT 1630. WHILE OFF SEDATION PT
OPENED EYES RANDOMLY AND MOVED ALL EXTREMEITES. PT DID NOT FOLLOW COMMANDS OR
RESPOND TO YES/NO QUESTIONS. PT THEN BECAME RESTLESS, TACHYCARDIC AND
HYPERTENSIVE. SEDATION RESTARTED. WILL CONTINUE TO MONITOR.

## 2021-02-19 VITALS — OXYGEN SATURATION: 92 %

## 2021-02-19 VITALS — OXYGEN SATURATION: 95 %

## 2021-02-19 VITALS — OXYGEN SATURATION: 93 %

## 2021-02-19 VITALS — OXYGEN SATURATION: 96 %

## 2021-02-19 VITALS — OXYGEN SATURATION: 99 %

## 2021-02-19 VITALS — OXYGEN SATURATION: 97 %

## 2021-02-19 VITALS — OXYGEN SATURATION: 94 %

## 2021-02-19 VITALS — OXYGEN SATURATION: 98 %

## 2021-02-19 VITALS — OXYGEN SATURATION: 100 %

## 2021-02-19 VITALS
SYSTOLIC BLOOD PRESSURE: 128 MMHG | DIASTOLIC BLOOD PRESSURE: 60 MMHG | HEART RATE: 78 BPM | TEMPERATURE: 98.4 F | OXYGEN SATURATION: 94 %

## 2021-02-19 VITALS — DIASTOLIC BLOOD PRESSURE: 51 MMHG | TEMPERATURE: 99 F | SYSTOLIC BLOOD PRESSURE: 112 MMHG | HEART RATE: 80 BPM

## 2021-02-19 VITALS — SYSTOLIC BLOOD PRESSURE: 110 MMHG | HEART RATE: 76 BPM | DIASTOLIC BLOOD PRESSURE: 52 MMHG | TEMPERATURE: 98.1 F

## 2021-02-19 VITALS — HEART RATE: 81 BPM | DIASTOLIC BLOOD PRESSURE: 51 MMHG | SYSTOLIC BLOOD PRESSURE: 108 MMHG | TEMPERATURE: 98.2 F

## 2021-02-19 VITALS — DIASTOLIC BLOOD PRESSURE: 53 MMHG | SYSTOLIC BLOOD PRESSURE: 124 MMHG | HEART RATE: 90 BPM | TEMPERATURE: 98.3 F

## 2021-02-19 VITALS
HEART RATE: 99 BPM | SYSTOLIC BLOOD PRESSURE: 120 MMHG | TEMPERATURE: 98.2 F | DIASTOLIC BLOOD PRESSURE: 55 MMHG | OXYGEN SATURATION: 93 %

## 2021-02-19 LAB
ANION GAP SERPL CALC-SCNC: 1 MMOL/L (ref 7–16)
ANISOCYTOSIS BLD QL: (no result)
BASE EXCESS BLDA CALC-SCNC: 4.9 MMOL/L (ref -2–2)
BUN SERPL-MCNC: 31 MG/DL (ref 7–17)
CALCIUM SERPL-MCNC: 8.3 MG/DL (ref 8.4–10.2)
CHLORIDE SERPL-SCNC: 102 MMOL/L (ref 98–107)
CO2 BLDA-SCNC: 31.3 MMOL/L
CO2 SERPL-SCNC: 33 MMOL/L (ref 22–30)
CREAT SERPL-SCNC: 0.55 UMOL/L (ref 0.52–1.25)
ERYTHROCYTE [DISTWIDTH] IN BLOOD BY AUTOMATED COUNT: 13.1 % (ref 11.5–14.5)
GLUCOSE SERPL-MCNC: 131 MG/DL (ref 74–106)
HCO3 BLDA-SCNC: 29.9 MEQ/L (ref 22–26)
HCT VFR BLD AUTO: 29.3 % (ref 37–47)
HGB BLD-MCNC: 9.1 G/DL (ref 12.5–16)
INHALED O2 CONCENTRATION: 35 %
LYMPHOCYTES NFR BLD MANUAL: 11 % (ref 20–51)
MCH RBC QN AUTO: 32 PG (ref 27–31)
MCHC RBC AUTO-ENTMCNC: 31 G/DL (ref 33–37)
MCV RBC AUTO: 102 FL (ref 80–100)
MICROCYTES BLD QL SMEAR: (no result)
MONOCYTES NFR BLD: 1 % (ref 1.7–9.3)
MYELOCYTES NFR BLD MANUAL: 1 % (ref 0–0)
NEUTS BAND NFR BLD: 1 % (ref 0–10)
NEUTS SEG NFR BLD MANUAL: 86 % (ref 42–75.2)
PCO2 BLDA: 46.5 MMHG (ref 35–45)
PLATELET # BLD AUTO: 231 K/MM3 (ref 130–400)
PLATELET BLD QL SMEAR: NORMAL
PMV BLD AUTO: 9 FL (ref 7.4–10.4)
PO2 BLDA: 65.6 MMHG (ref 80–100)
POTASSIUM SERPL-SCNC: 4.1 MMOL/L (ref 3.4–5)
RBC # BLD AUTO: 2.87 M/MM3 (ref 4.1–5.3)
SAO2 % BLDA: 93 % (ref 92–100)
SODIUM SERPL-SCNC: 136 MMOL/L (ref 137–145)

## 2021-02-19 NOTE — NUR
PT AROUSING AND SITTING UP IN BED TRYING TO PULL OUT THE ETT. PT DOES NOT
ATTEMPT TO FOLLOW COMMANDS. DR LOVELACE REQUESTS SEDATION TO BE RESTARTED.

## 2021-02-19 NOTE — NUR
Patient had an uneventful night. At 4AM I stopped her sedation and around 430
she started gagging and had a very large drooling episode. We suctioned her
and cleaned her up. RT is doing morning ABG's. Patient is now calm and
resting. Sedation is still off.

## 2021-02-19 NOTE — NUR
followed up with Vanesa, Palliative RN after family meeting which
included Vanesa Slade, and patient's daughter/DPOA-HC Natalya. Family would
not want trach/PEG for patient. Patient's son, Dipak is flying in from [place]
tomorrow and is hopeful to be able to see patient. Plan is to attempt
extubation possibly tomorrow and SW will continue to follow.

## 2021-02-19 NOTE — NUR
collaborated with DC Reyes about discharge plan. Amy
advised that patient may go comfort over the weekend depending on how
extubation goes. ZUNILDA contacted patient's daughter, Natalya who advised they
would be interested in [facility] if they decide to pursue
comfort. Natalya is agreeable to referral being sent to [facility] in case this is
what the decide to do. ZUNILDA contacted Clari at [facility] and faxed referral.

## 2021-02-19 NOTE — NUR
FAMILY MEETING WITH DAUGHTER, TOMA AND DR OLVELACE, IVETH YODER, RN AND RN
DISCUSSING POC AND PT'S CURRENT STATUS. TOMA DOES STATE SHE DOES NOT WANT TO
TRACH AND PEG PT. TOMA STATES HER BROTHER CASSY IS FLYING INTO TOWN TOMORROW
AND WOULD LIKE TO WAIT TILL HE COMES SO HE CAN SEE THE PT BEFORE EXTUBATING.
TOMA DOES STATE SHE THINKS THEY ARE LEAVING MORE TOWARDS COMFORT CARE AND
NOT REINTUBATING IF NECESSARY BUT WILL MAKE FINAL DECISIONS TOMORROW WHEN
BROTHER ARRIVES. PER DR LOVELACE, OK TO RESTART TF AT THIS TIME UNTIL TOMORROW
WHEN DECISIONS ARE MADE.

## 2021-02-19 NOTE — NUR
I participatied in meeting with Dr Kohler, Natalya Santo, and Amy IRWIN.
Daughter is very clear that her mother would not want a trach or peg
placement.  Patient's son is coming from [place] on Saturday and would like
to visit his mother before making a final decision.  Dr Kohler has agreed to
allow them to visit with her and then we will try to extubate if reasonable.
She has not tolerated weaning trials to date fighting the vent and restraints,
biting the ET tube until sedation is increased.  Daughter did visit her mother
at bedside today and was stroking her head and talking to her.  Daughter felt
that she turned her head slightly toward her.  The family may want to do a
video call with patient's sister in [place] if her sister is interested.
I did talk about comfort measures that can be available to keep Jacquelyn
comfortable after extubation to avoid air hunger but that these would depend
on their goals after the chilren talk together.

## 2021-02-19 NOTE — NUR
RECEIVED REPORT FROM DC DE GUZMAN. PT RESTING EASILY ON CURRENT VENT SETTINGS: AC,
, PEEP 5, RR 15, FIO2 30%. SEDATION ON HOLD TO START WEANING TRIAL SOON
TO SEE HOW PT'S MENTAL STATUS IS. BSW IN PLACE. VSS. FC PATENT AND DRAINING TO
GRAVITY. TF ON HOLD.

## 2021-02-19 NOTE — NUR
Assessment complete. Pt on ventilator. Responsive to voice but cannot follow
commands. LSC triple lumen intact. Mims to DD is free of complications. Pt
repositioned for comfort. Call light within reach.

## 2021-02-20 VITALS
HEART RATE: 88 BPM | DIASTOLIC BLOOD PRESSURE: 56 MMHG | TEMPERATURE: 98.8 F | SYSTOLIC BLOOD PRESSURE: 116 MMHG | OXYGEN SATURATION: 92 %

## 2021-02-20 VITALS — OXYGEN SATURATION: 95 %

## 2021-02-20 VITALS — OXYGEN SATURATION: 92 %

## 2021-02-20 VITALS — OXYGEN SATURATION: 94 %

## 2021-02-20 VITALS — OXYGEN SATURATION: 97 %

## 2021-02-20 VITALS — OXYGEN SATURATION: 98 %

## 2021-02-20 VITALS — OXYGEN SATURATION: 96 %

## 2021-02-20 VITALS — DIASTOLIC BLOOD PRESSURE: 55 MMHG | SYSTOLIC BLOOD PRESSURE: 117 MMHG | TEMPERATURE: 99 F | HEART RATE: 83 BPM

## 2021-02-20 VITALS — OXYGEN SATURATION: 86 %

## 2021-02-20 VITALS — OXYGEN SATURATION: 93 %

## 2021-02-20 VITALS — OXYGEN SATURATION: 91 %

## 2021-02-20 VITALS
DIASTOLIC BLOOD PRESSURE: 72 MMHG | TEMPERATURE: 98.8 F | SYSTOLIC BLOOD PRESSURE: 120 MMHG | HEART RATE: 88 BPM | OXYGEN SATURATION: 93 %

## 2021-02-20 VITALS — OXYGEN SATURATION: 84 %

## 2021-02-20 VITALS — SYSTOLIC BLOOD PRESSURE: 121 MMHG | HEART RATE: 92 BPM | TEMPERATURE: 99 F | DIASTOLIC BLOOD PRESSURE: 78 MMHG

## 2021-02-20 VITALS
DIASTOLIC BLOOD PRESSURE: 66 MMHG | OXYGEN SATURATION: 98 % | SYSTOLIC BLOOD PRESSURE: 144 MMHG | HEART RATE: 81 BPM | TEMPERATURE: 98.2 F

## 2021-02-20 VITALS — OXYGEN SATURATION: 85 %

## 2021-02-20 VITALS — OXYGEN SATURATION: 83 %

## 2021-02-20 VITALS — OXYGEN SATURATION: 87 %

## 2021-02-20 VITALS — OXYGEN SATURATION: 89 %

## 2021-02-20 VITALS — OXYGEN SATURATION: 90 %

## 2021-02-20 VITALS — OXYGEN SATURATION: 82 %

## 2021-02-20 VITALS — OXYGEN SATURATION: 100 %

## 2021-02-20 LAB
ANION GAP SERPL CALC-SCNC: 2 MMOL/L (ref 7–16)
BASE EXCESS BLDA CALC-SCNC: 4.1 MMOL/L (ref -2–2)
BASOPHILS # BLD: 0 10*3/UL (ref 0–0.2)
BASOPHILS NFR BLD AUTO: 0.2 % (ref 0–2)
BUN SERPL-MCNC: 27 MG/DL (ref 7–17)
CALCIUM SERPL-MCNC: 8.1 MG/DL (ref 8.4–10.2)
CHLORIDE SERPL-SCNC: 98 MMOL/L (ref 98–107)
CO2 BLDA-SCNC: 29.5 MMOL/L
CO2 SERPL-SCNC: 32 MMOL/L (ref 22–30)
CREAT SERPL-SCNC: 0.57 UMOL/L (ref 0.52–1.25)
EOSINOPHIL # BLD: 0.3 10*3/UL (ref 0–0.7)
EOSINOPHIL NFR BLD: 3 % (ref 0–4)
ERYTHROCYTE [DISTWIDTH] IN BLOOD BY AUTOMATED COUNT: 12.8 % (ref 11.5–14.5)
GLUCOSE SERPL-MCNC: 170 MG/DL (ref 74–106)
GRANULOCYTES # BLD AUTO: 80 % (ref 42.2–75.2)
HCO3 BLDA-SCNC: 28.2 MEQ/L (ref 22–26)
HCT VFR BLD AUTO: 26.7 % (ref 37–47)
HGB BLD-MCNC: 8.5 G/DL (ref 12.5–16)
INHALED O2 CONCENTRATION: 30 %
LYMPHOCYTES # BLD: 0.7 10*3/UL (ref 1.2–3.4)
LYMPHOCYTES NFR BLD: 8.4 % (ref 20–51)
MCH RBC QN AUTO: 32 PG (ref 27–31)
MCHC RBC AUTO-ENTMCNC: 32 G/DL (ref 33–37)
MCV RBC AUTO: 100 FL (ref 80–100)
MONOCYTES # BLD: 0.4 10*3/UL (ref 0.1–0.6)
MONOCYTES NFR BLD AUTO: 4.5 % (ref 1.7–9.3)
NEUTROPHILS # BLD: 6.6 10*3/UL (ref 1.4–6.5)
PCO2 BLDA: 40.4 MMHG (ref 35–45)
PLATELET # BLD AUTO: 244 K/MM3 (ref 130–400)
PMV BLD AUTO: 9.5 FL (ref 7.4–10.4)
PO2 BLDA: 71 MMHG (ref 80–100)
POTASSIUM SERPL-SCNC: 3.9 MMOL/L (ref 3.4–5)
RBC # BLD AUTO: 2.66 M/MM3 (ref 4.1–5.3)
SAO2 % BLDA: 94.3 % (ref 92–100)
SODIUM SERPL-SCNC: 131 MMOL/L (ref 137–145)

## 2021-02-20 NOTE — NUR
To room 315 via gurney from ICU. Transferred to bed with three assist. Has
soiled linens-liquid tan bowel movement-large in size. Lily care
provided-bedding changed-repositioned in bed with pillow support. New orders
for comfort care-family was sent home earlier in shift from ICU. Notified
daughter Natalya that her mother had been moved to room 315 on medical floor.
Went over visiting policy. Daughter with questions about whether mother will
make it through the night. Discussed at length that it was very hard to
tell-instructed that she could come to medical floor to be with her mother
during this end of life time. Natalya stated she would come up and stay with
her mother tonight.

## 2021-02-20 NOTE — NUR
PT EXTUBATED TO COMFORT CARE PER MD ORDER.  PT SUCTIONED PRE/POST EXTUBATION.
PT CURRENTLY ON ROOM AIR.

## 2021-02-20 NOTE — NUR
RECEIVED REPORT FROM DC YUEN. PT RESTING EASILY ON CURRENT VENT SETTINGS:
, PEEP 5, FIO2 30, RR 15. FC PATENT AND DRAINING TO GRAVITY. BSW IN
PLACE. VSS. SEE GTT FLOWSHEET.

## 2021-02-20 NOTE — NUR
SPOKE TO DR LOVELACE ABOUT POC. DISCUSSED IF CHILDREN VISIT THIS AFTERNOON AND DO
DECIDE THAT THEY WOULD LIKE TO MOVE FORWARD TODAY WITH EXTUBATION AND COMFORT
CARE IF HE WAS OKAY WITH THAT. PHYSICIAN STATES IT IS OKAY WITH THAT POC IF
THEY WANT AND IS ALSO OKAY TO WAIT. PHYSICIAN STATES IF PT DOES GO COMFORT
CARE TO CONTACT DR RAMOS FOR ORDERS.

## 2021-02-20 NOTE — NUR
Patient's family wanted to know if she can  be on a few liters of oxygen, her
saturation was 85. I spoke to Bea ALONZO and she spoke to the family. They still
wanted her to remain on comfort care, and not reinutbated but they just wanted
some oxygen. Bea ALONZO confirmed they wanted her to remain DNR and comfort care
and they agreed. Patient's saturated with 2 L nasal cannula is 92. She got
transfer orders to the medical floor. Report was given to Sara IRWIN.

## 2021-02-20 NOTE — NUR
Daughter of patient arrived to room 315 at this time. Discussed comfort care
and options for pain medications. Instructed on policy of staying in room with
mask on while visiting during end of life. Verbalizes understanding/denies
questions/concerns.

## 2021-02-20 NOTE — NUR
SPOKE TO TOMA, DAUGHTER ABOUT POC. SHE STATES HER AND HER BROTHER WILL BE
HERE AROUND 1500 TO VISIT AND PLAN TO EXTUBATE TO COMFORT CARE. DR RAMOS
NOTIFIED OF POC.

## 2021-02-20 NOTE — NUR
RT AND RN AT BEDSIDE AND EXTUBATED PT. PT TOLERATED OK. SON AND DAUGHTER AT
BEDSIDE. SPOKE TO CHERI DARDEN ABOUT OBTAINING FULL COMFORT CARE ORDERS AND
TRANSFER ORDERS.

## 2021-02-21 NOTE — NUR
Pt's son with several questions about pt's current status and how she got to
this point. This nurse answers questions to the best of ability within scope
of practice. Pt's son verbalizes appreciation and understanding. Note passed
on to night shift nurse to have provider stop in if available tonight or
intensivist tomorrow. Pt has been resting with eyes closed, resp even and
unlabored, most of afternoon.

## 2021-02-21 NOTE — NUR
Report with DC Ruiz. Pt starting to have labored breathing with heavy
gurgling. PRN Morphine administered per orders. Mims to DD without s/s of
complications. Triple lumen catheter to left subclavian without s/s of
complications. O2 at 2 L/min via NC for comfort. Pt's daughter at bedside,
denies further needs at this time.

## 2021-02-21 NOTE — NUR
Patient laying in bed with eyes closed upon enter the room. Patient
not responding to verbal or tactile stimulations. Breathing shallow
and gurgling sound noted. PRN Atropine 2 drops given per MAR. Patient appears
comfortable. No s/s of pain or discomfort. No acute distress noted.  Patient's
daughter and son in the room. Hot chocolate provided to family per request.
Call light within reach. Family agreed to call the nurse if needs anything or
any changes in condition observed. Will continue to monitor.

## 2021-02-21 NOTE — NUR
Daughter called to desk stating "Mom seems to be getting uncomfortable again,
can she have the morphine." Patient awake in bed moving arms/head back and
fourth-making noises. Repositioned in bed. Morphine given per dr order for
comfort.

## 2021-02-21 NOTE — NUR
Pt resting with eyes closed, resp shallow, unlabored, still sounding wet. PRN
Atropine 2 drops administered per orders. Pt's daughter remains at bedside,
denies needs at this time.

## 2021-02-21 NOTE — NUR
Pt's resp heavier, slightly more labored with intermittent gurgling. PRN
Roxanol administered per orders. Suction attempted with Staci but pt
swallowing secretions at this time. Pt's daughter at bedside, denies further
needs.

## 2021-02-21 NOTE — NUR
Patient plans to go comfort care tomorrow with [facility]
Monday 2/22.  Please assist coordinating transportation for Monday.

## 2021-02-22 NOTE — NUR
was notified by RN, Haylie that after rounding, patient's family
has decided to discharge to [facility]. ZUNILDA followed up with
patient's daughter/OSBALDO Hoang who is in agreement. ZUNILDA collaborated with Clari
at [facility] and set transport time for 1300. SW contacted [facility] who will
provide transportation. ZUNILDA placed completed forms on patient's chart. SW faxed
discharge orders and negative COVID test to [facility]. SW provided transport time
to patient's daughterNatalya. Patient to discharge to [facility] today.

## 2021-02-22 NOTE — NUR
AT BEDSIDE TO NOW TALK WITH SON & DAUGHTER. HE REVIEWED PLAN, FAMILY
DIDN'T WANT AGGRESIVE TREATMENT WITH TRACH OR PEG TUBE AND THUS, THE COMFORT
CARE DECISION WAS MADE. DAUGHTER AGREED, THIS WAS THE PLAN. THE SON IS STILL
STRUGGLING WITH HOSPICE DECISION AND ASKING ABOUT MORE ANTIBIOTICS AND ORAL
SECRETIONS. PATIENT IS UNABLE TO CLEAR HE OWN SECREATIONS OR TAKE ANY ORAL
MEDS OR NUTRITION.  CONTINUED TO EXPLAIN THE HIGH RISK FOR ASPIRATION
IF PATIENT WAS ALLOWED ORAL INTAKE. SON SEEMS TO UNDERSTANDS MOST OF THESE
CONCEPTS. THE DAUGHTER ASKED ABOUT SEPSIS AND ORGAN FAILURE WHICH WAS ALSO
ADDRESSED. THE SON IS CIRCLING BACK TO HIS BEGINING CONVERSATION, ASKING THE
DOCTOR TO COMPARE HER PREVIOUS CONDITION TO HER HOSPITALIZED CONDITION. BOTH
 &  DISCUSSED ONLY HER HOSPITALIZED CONDITION AND REFERED TO
THE DAUGHTER (WHOM SHE LIVED WITH) ABOUT HER BASELING ACTIVITLY. SON HAS NOT
BEEN BACK IN APPROX A YEAR. SON IS NOW ASKING PHYSICIANS WHAT SHE HEARS AND
UNDERSTANDS. SON SEEMED SHOCKED THE PHYSICIANS WERE NOT ABLE TO TELL HIM IS
SHE WAS ABLE TO UNDERSTAND.

## 2021-02-22 NOTE — NUR
Incontinent brief changed and luis-care provided around 0300. Small amount of
incontinent BM noted. Repositioned patient. PRN Atropine 2 drops given for
increased secretions at this time. Family at bedside. Will give report to day
shift nurse.

## 2021-02-22 NOTE — NUR
EMS HERE TO TRANSPORT PATIENT TO THE [facility]. FAMILY AT BEDSIDE. CALLED
REPORT TO NURSE. INFORMATION PACKET SENT WITH EMS. PATIENT DISCHARGED.

## 2021-02-22 NOTE — NUR
PATIENT IS RESTING COMFORTABLY IN BED WITH FAMILY AT BEDSIDE. DAUGHTER DENIES
NEED FOR ORAL ROXINOL AT THIS TIME. WILL MONITOR.

## 2021-02-22 NOTE — NUR
AT BEDSIDE EXPLAINING PATIENT STATUS, SPECIFICALLY TO THE SON.
DAUGHTER, OSBALDO AT BEDSIDE HAS BEEN AT BEDSIDE DURING THE PATIENT'S
HOSPITALIZATION. SON FLEW IN ON SAT FROM [place] AND SEEMS TO BE HAVING
DIFFICULTY WITH ACCEPTING THE PATIENT'S CONDITION. PATIENT HAS MANY CHRONIC
CONDITION AND HAS DECLINED. SON DOESN'T WANT A TRACH, PEG TUBE OR INTUBATION
BUT WANTS PATIENT TO GET TREATMENT OPTIONS (HE'S NOT SURE WHAT) HE JUST
DOESN'T LIKE THE IDEA OF HOSPICE. DAUGHTER AT BEDSIDE SEEMS TO UNDERSTAND THE
DIFFERENCE BETWEEN AGGRESIVE TREATMENT OR MANAGING WITH COMFORT CARES THROUGH
HOSPICE. HOSPICE PLANS ARE NOW CURRENTLY ON HOLD.

## 2021-02-22 NOTE — NUR
Son was raising questions today about comfort care and why this decision was
made to take her off the vent and move to [facility].  He
wanted to know what has caused this, why she couldn't continue on the vent,
and why we weren't still giving her antibiotics.  He had arrived on 2/20 by
flight into [place].  He had talked with staff, his sister and with the
physicians on her case over the weekend but was really struggling with why his
mother wouldn't get better.  I talked with them about aspiration, that at this
point is a time to switch from ET tube to trach and that the [facility], did not
think that a trach and feeding tube was appropriate and in line with her
mother's wishes.  Dr Kohler and Dr Zamora also went in to talk with family.
The decision to move forward with planto transfer to [facility] was made.  Comfort quilt was provided.